# Patient Record
Sex: FEMALE | Race: WHITE | Employment: OTHER | URBAN - METROPOLITAN AREA
[De-identification: names, ages, dates, MRNs, and addresses within clinical notes are randomized per-mention and may not be internally consistent; named-entity substitution may affect disease eponyms.]

---

## 2018-02-20 ENCOUNTER — HOSPITAL ENCOUNTER (OUTPATIENT)
Dept: GENERAL RADIOLOGY | Age: 68
Discharge: HOME | End: 2018-02-20
Payer: MEDICARE

## 2018-02-20 DIAGNOSIS — R06.02 SHORTNESS OF BREATH: ICD-10-CM

## 2018-02-20 PROCEDURE — 71046 X-RAY EXAM CHEST 2 VIEWS: CPT

## 2019-01-22 ENCOUNTER — HOSPITAL ENCOUNTER (OUTPATIENT)
Dept: MRI IMAGING | Age: 69
Discharge: HOME | End: 2019-01-22
Attending: ORTHOPAEDIC SURGERY
Payer: MEDICARE

## 2019-01-22 DIAGNOSIS — M19.011 ARTHROSIS OF RIGHT ACROMIOCLAVICULAR JOINT: ICD-10-CM

## 2019-01-22 DIAGNOSIS — M75.121 COMPLETE TEAR OF RIGHT ROTATOR CUFF: ICD-10-CM

## 2019-01-22 PROCEDURE — 73221 MRI JOINT UPR EXTREM W/O DYE: CPT

## 2019-09-18 ENCOUNTER — HOSPITAL ENCOUNTER (OUTPATIENT)
Dept: MRI IMAGING | Age: 69
Discharge: HOME | End: 2019-09-18
Attending: ORTHOPAEDIC SURGERY
Payer: MEDICARE

## 2019-09-18 DIAGNOSIS — M48.02 CERVICAL SPINAL STENOSIS: ICD-10-CM

## 2019-09-18 DIAGNOSIS — M54.12 CERVICAL RADICULITIS: ICD-10-CM

## 2019-09-18 PROCEDURE — 72141 MRI NECK SPINE W/O DYE: CPT

## 2019-09-24 ENCOUNTER — HOSPITAL ENCOUNTER (OUTPATIENT)
Dept: PREADMISSION TESTING | Age: 69
Discharge: HOME | End: 2019-09-24
Payer: MEDICARE

## 2019-09-24 ENCOUNTER — HOSPITAL ENCOUNTER (OUTPATIENT)
Dept: GENERAL RADIOLOGY | Age: 69
Discharge: HOME | End: 2019-09-24
Payer: MEDICARE

## 2019-09-24 VITALS
WEIGHT: 236 LBS | BODY MASS INDEX: 34.96 KG/M2 | TEMPERATURE: 97.9 F | SYSTOLIC BLOOD PRESSURE: 140 MMHG | HEART RATE: 72 BPM | HEIGHT: 69 IN | OXYGEN SATURATION: 96 % | RESPIRATION RATE: 20 BRPM | DIASTOLIC BLOOD PRESSURE: 72 MMHG

## 2019-09-24 DIAGNOSIS — R06.89 DYSPNEA AND RESPIRATORY ABNORMALITY: ICD-10-CM

## 2019-09-24 DIAGNOSIS — R06.00 DYSPNEA AND RESPIRATORY ABNORMALITY: ICD-10-CM

## 2019-09-24 LAB
25(OH)D3 SERPL-MCNC: 12.9 NG/ML (ref 30–100)
ABO + RH BLD: NORMAL
ALBUMIN SERPL-MCNC: 4 G/DL (ref 3.5–5)
ALBUMIN/GLOB SERPL: 1 {RATIO} (ref 1.1–2.2)
ALP SERPL-CCNC: 100 U/L (ref 45–117)
ALT SERPL-CCNC: 12 U/L (ref 12–78)
ANION GAP SERPL CALC-SCNC: 8 MMOL/L (ref 5–15)
APPEARANCE UR: CLEAR
APTT PPP: 25.7 SEC (ref 22.1–32)
AST SERPL-CCNC: 22 U/L (ref 15–37)
BACTERIA URNS QL MICRO: NEGATIVE /HPF
BASOPHILS # BLD: 0 K/UL (ref 0–0.1)
BASOPHILS NFR BLD: 0 % (ref 0–1)
BILIRUB SERPL-MCNC: 0.4 MG/DL (ref 0.2–1)
BILIRUB UR QL: NEGATIVE
BLOOD GROUP ANTIBODIES SERPL: NORMAL
BUN SERPL-MCNC: 13 MG/DL (ref 6–20)
BUN/CREAT SERPL: 16 (ref 12–20)
CALCIUM SERPL-MCNC: 9.6 MG/DL (ref 8.5–10.1)
CHLORIDE SERPL-SCNC: 103 MMOL/L (ref 97–108)
CO2 SERPL-SCNC: 27 MMOL/L (ref 21–32)
COLOR UR: NORMAL
CREAT SERPL-MCNC: 0.81 MG/DL (ref 0.55–1.02)
DIFFERENTIAL METHOD BLD: ABNORMAL
EOSINOPHIL # BLD: 0 K/UL (ref 0–0.4)
EOSINOPHIL NFR BLD: 0 % (ref 0–7)
EPITH CASTS URNS QL MICRO: NORMAL /LPF
ERYTHROCYTE [DISTWIDTH] IN BLOOD BY AUTOMATED COUNT: 13.4 % (ref 11.5–14.5)
EST. AVERAGE GLUCOSE BLD GHB EST-MCNC: 120 MG/DL
GLOBULIN SER CALC-MCNC: 3.9 G/DL (ref 2–4)
GLUCOSE SERPL-MCNC: 102 MG/DL (ref 65–100)
GLUCOSE UR STRIP.AUTO-MCNC: NEGATIVE MG/DL
HBA1C MFR BLD: 5.8 % (ref 4.2–6.3)
HCT VFR BLD AUTO: 43 % (ref 35–47)
HGB BLD-MCNC: 13.8 G/DL (ref 11.5–16)
HGB UR QL STRIP: NEGATIVE
HYALINE CASTS URNS QL MICRO: NORMAL /LPF (ref 0–5)
IMM GRANULOCYTES # BLD AUTO: 0.1 K/UL (ref 0–0.04)
IMM GRANULOCYTES NFR BLD AUTO: 1 % (ref 0–0.5)
INR PPP: 1 (ref 0.9–1.1)
KETONES UR QL STRIP.AUTO: NEGATIVE MG/DL
LEUKOCYTE ESTERASE UR QL STRIP.AUTO: NEGATIVE
LYMPHOCYTES # BLD: 3.4 K/UL (ref 0.8–3.5)
LYMPHOCYTES NFR BLD: 14 % (ref 12–49)
MCH RBC QN AUTO: 29.8 PG (ref 26–34)
MCHC RBC AUTO-ENTMCNC: 32.1 G/DL (ref 30–36.5)
MCV RBC AUTO: 92.9 FL (ref 80–99)
MONOCYTES # BLD: 1.8 K/UL (ref 0–1)
MONOCYTES NFR BLD: 7 % (ref 5–13)
NEUTS SEG # BLD: 19.4 K/UL (ref 1.8–8)
NEUTS SEG NFR BLD: 78 % (ref 32–75)
NITRITE UR QL STRIP.AUTO: NEGATIVE
NRBC # BLD: 0 K/UL (ref 0–0.01)
NRBC BLD-RTO: 0 PER 100 WBC
PH UR STRIP: 6 [PH] (ref 5–8)
PLATELET # BLD AUTO: 290 K/UL (ref 150–400)
PMV BLD AUTO: 11.2 FL (ref 8.9–12.9)
POTASSIUM SERPL-SCNC: 4.5 MMOL/L (ref 3.5–5.1)
PROT SERPL-MCNC: 7.9 G/DL (ref 6.4–8.2)
PROT UR STRIP-MCNC: NEGATIVE MG/DL
PROTHROMBIN TIME: 9.8 SEC (ref 9–11.1)
RBC # BLD AUTO: 4.63 M/UL (ref 3.8–5.2)
RBC #/AREA URNS HPF: NORMAL /HPF (ref 0–5)
SODIUM SERPL-SCNC: 138 MMOL/L (ref 136–145)
SP GR UR REFRACTOMETRY: 1.02 (ref 1–1.03)
SPECIMEN EXP DATE BLD: NORMAL
THERAPEUTIC RANGE,PTTT: NORMAL SECS (ref 58–77)
UA: UC IF INDICATED,UAUC: NORMAL
UROBILINOGEN UR QL STRIP.AUTO: 0.2 EU/DL (ref 0.2–1)
WBC # BLD AUTO: 24.7 K/UL (ref 3.6–11)
WBC URNS QL MICRO: NORMAL /HPF (ref 0–4)

## 2019-09-24 PROCEDURE — 85610 PROTHROMBIN TIME: CPT

## 2019-09-24 PROCEDURE — 82306 VITAMIN D 25 HYDROXY: CPT

## 2019-09-24 PROCEDURE — 86900 BLOOD TYPING SEROLOGIC ABO: CPT

## 2019-09-24 PROCEDURE — 85025 COMPLETE CBC W/AUTO DIFF WBC: CPT

## 2019-09-24 PROCEDURE — 83036 HEMOGLOBIN GLYCOSYLATED A1C: CPT

## 2019-09-24 PROCEDURE — 85730 THROMBOPLASTIN TIME PARTIAL: CPT

## 2019-09-24 PROCEDURE — 80053 COMPREHEN METABOLIC PANEL: CPT

## 2019-09-24 PROCEDURE — 36415 COLL VENOUS BLD VENIPUNCTURE: CPT

## 2019-09-24 PROCEDURE — 71046 X-RAY EXAM CHEST 2 VIEWS: CPT

## 2019-09-24 PROCEDURE — 81001 URINALYSIS AUTO W/SCOPE: CPT

## 2019-09-24 RX ORDER — METOPROLOL TARTRATE 25 MG/1
TABLET, FILM COATED ORAL 2 TIMES DAILY
COMMUNITY

## 2019-09-24 RX ORDER — HYDROXYCHLOROQUINE SULFATE 200 MG/1
200 TABLET, FILM COATED ORAL DAILY
COMMUNITY
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
## 2019-09-24 NOTE — PERIOP NOTES
[address], [address] [facility]   MAIN OR                                  [phone]   MAIN PRE OP                          [phone]                                                                                AMBULATORY PRE OP          [phone]  PRE-ADMISSION TESTING    [phone]   Surgery Date:   10/1/19         Is surgery arrival time given by surgeon? NO  If NO, [facility] staff will call you between 3 and 7pm the day before your surgery with your arrival time. (If your surgery is on a Monday, we will call you the Friday before.)    Call [phone] after 7pm Monday-Friday if you did not receive your arrival time. INSTRUCTIONS BEFORE YOUR SURGERY   When You  Arrive Arrive at the [address]nd [address] [facility] on the day of your surgery  Have your insurance card, photo ID, and any copayment (if needed)   Food   and   Drink NO food or drink after midnight the night before surgery    This means NO water, gum, mints, coffee, juice, etc.  No alcohol (beer, wine, liquor) 24 hours before and after surgery   Medications to   TAKE   Morning of Surgery MEDICATIONS TO TAKE THE MORNING OF SURGERY WITH A SIP OF WATER:    Metoprolol,Nexium   Medications  To  STOP      7 days before surgery  Non-Steroidal anti-inflammatory Drugs (NSAID's): for example, Ibuprofen (Advil, Motrin), Naproxen (Aleve)   Aspirin, if taking for pain    Herbal supplements, vitamins, and fish oil   Other:  (Pain medications not listed above, including Tylenol may be taken)   Blood  Thinners  If you take  Aspirin, Plavix, Coumadin, or any blood-thinning or anti-blood clot medicine, talk to the doctor who prescribed the medications for pre-operative instructions.    Bathing Clothing  Jewelry  Valuables       If you shower the morning of surgery, please do not apply anything to your skin (lotions, powders, deodorant, or makeup, especially mascara)   Follow all special bath instructions (for total joint replacement, spine and bowel surgeries)   Do not shave or trim anywhere 24 hours before surgery   Wear your hair loose or down; no pony-tails, buns, or metal hair clips   Wear loose, comfortable, clean clothes   Wear glasses instead of contacts   Leave money, valuables, and jewelry, including body piercings, at home   Going Home - or Spending the Night  SAME-DAY SURGERY: You must have a responsible adult drive you home and stay with you 24 hours after surgery   ADMITS: If your doctor is keeping you in the hospital after surgery, leave personal belongings/luggage in your car until you have a hospital room number. Hospital discharge time is 12 noon  Drivers must be here before 12 noon unless you are told differently   Special Instructions 16. Special Instructions:  · Use Chlorhexidine Care Fusion wash and sponges 3 days prior to surgery as instructed. · Incentive spirometer given with instructions to practice at home and bring back to the hospital on the day of surgery. · [facility] will contact you if your Hemoglobin A1C is greater than 7.5. · Pain pamphlet and Call Don't Fall reminder reviewed with patient. ·  parking is complimentary Monday - Friday 7 am - 5 pm  · Bring PTA Medication list day of surgery with the last doses taken documented   · Do not bring medication bottles the day of surgery     Follow all instructions so your surgery wont be cancelled. Please, be on time. If a situation occurs and you are delayed the day of surgery, call [phone]     If your physical condition changes (like a fever, cold, flu, etc.) call your surgeon. The patient was contacted  in person. Home medication reviewed and verified during PAT appointment. The patient verbalizes understanding of all instructions and does not  need reinforcement.

## 2019-09-24 NOTE — FACE TO FACE
The patient and  attended the pre-operative spine class. The content of the class was presented using a power point presentation and visual demonstrations specific for patients undergoing surgical spine procedures of the neck and back. A pre-operative Patient education booklet specific to spine surgery was given to patient. Incentive spirometer and CHG bath kits were demonstrated in class. Day of surgery routine and expectations, hospital routine and expectations, nutrition, alcohol, nicotine, medications, infection control, pain management, DVT precautions and equipment, ice therapy, durable medical equipment, exercises, mobility expectations and precautions, home preparation and safety were reviewed in class. During class, patient had opportunities to ask questions of the material presented as well as any concerns about their upcoming surgery.

## 2019-09-25 LAB
BACTERIA SPEC CULT: NORMAL
BACTERIA SPEC CULT: NORMAL
SERVICE CMNT-IMP: NORMAL

## 2019-09-25 RX ORDER — CHOLECALCIFEROL TAB 125 MCG (5000 UNIT) 125 MCG
10000 TAB ORAL DAILY
Qty: 60 TAB | Refills: 0 | Status: SHIPPED | OUTPATIENT
Start: 2019-09-25 | End: 2019-10-25

## 2019-09-30 ENCOUNTER — ANESTHESIA EVENT (OUTPATIENT)
Dept: SURGERY | Age: 69
End: 2019-09-30
Payer: MEDICARE

## 2019-10-01 ENCOUNTER — ANESTHESIA (OUTPATIENT)
Dept: SURGERY | Age: 69
End: 2019-10-01
Payer: MEDICARE

## 2019-10-01 ENCOUNTER — HOSPITAL ENCOUNTER (OUTPATIENT)
Age: 69
Setting detail: OBSERVATION
Discharge: HOME | End: 2019-10-02
Attending: ORTHOPAEDIC SURGERY | Admitting: ORTHOPAEDIC SURGERY
Payer: MEDICARE

## 2019-10-01 ENCOUNTER — APPOINTMENT (OUTPATIENT)
Dept: GENERAL RADIOLOGY | Age: 69
End: 2019-10-01
Attending: ORTHOPAEDIC SURGERY
Payer: MEDICARE

## 2019-10-01 DIAGNOSIS — R52 ACUTE PAIN: Primary | ICD-10-CM

## 2019-10-01 PROBLEM — M48.02 CERVICAL STENOSIS OF SPINAL CANAL: Status: ACTIVE | Noted: 2019-10-01

## 2019-10-01 LAB
BASOPHILS # BLD: 0.1 K/UL (ref 0–0.1)
BASOPHILS NFR BLD: 1 % (ref 0–1)
DIFFERENTIAL METHOD BLD: ABNORMAL
EOSINOPHIL # BLD: 0.6 K/UL (ref 0–0.4)
EOSINOPHIL NFR BLD: 4 % (ref 0–7)
ERYTHROCYTE [DISTWIDTH] IN BLOOD BY AUTOMATED COUNT: 13.8 % (ref 11.5–14.5)
HCT VFR BLD AUTO: 41.8 % (ref 35–47)
HGB BLD-MCNC: 13.6 G/DL (ref 11.5–16)
IMM GRANULOCYTES # BLD AUTO: 0.1 K/UL (ref 0–0.04)
IMM GRANULOCYTES NFR BLD AUTO: 1 % (ref 0–0.5)
LYMPHOCYTES # BLD: 4.7 K/UL (ref 0.8–3.5)
LYMPHOCYTES NFR BLD: 33 % (ref 12–49)
MCH RBC QN AUTO: 30 PG (ref 26–34)
MCHC RBC AUTO-ENTMCNC: 32.5 G/DL (ref 30–36.5)
MCV RBC AUTO: 92.1 FL (ref 80–99)
MONOCYTES # BLD: 1.3 K/UL (ref 0–1)
MONOCYTES NFR BLD: 9 % (ref 5–13)
NEUTS SEG # BLD: 7.4 K/UL (ref 1.8–8)
NEUTS SEG NFR BLD: 52 % (ref 32–75)
NRBC # BLD: 0 K/UL (ref 0–0.01)
NRBC BLD-RTO: 0 PER 100 WBC
PLATELET # BLD AUTO: 407 K/UL (ref 150–400)
PMV BLD AUTO: 10.2 FL (ref 8.9–12.9)
RBC # BLD AUTO: 4.54 M/UL (ref 3.8–5.2)
WBC # BLD AUTO: 14.3 K/UL (ref 3.6–11)

## 2019-10-01 PROCEDURE — 74011000272 HC RX REV CODE- 272: Performed by: ORTHOPAEDIC SURGERY

## 2019-10-01 PROCEDURE — 77030038552 HC DRN WND MDII -A: Performed by: ORTHOPAEDIC SURGERY

## 2019-10-01 PROCEDURE — 77030011267 HC ELECTRD BLD COVD -A: Performed by: ORTHOPAEDIC SURGERY

## 2019-10-01 PROCEDURE — 77030018846 HC SOL IRR STRL H20 ICUM -A: Performed by: ORTHOPAEDIC SURGERY

## 2019-10-01 PROCEDURE — 77030029099 HC BN WAX SSPC -A: Performed by: ORTHOPAEDIC SURGERY

## 2019-10-01 PROCEDURE — 77030040356 HC CORD BPLR FRCP COVD -A: Performed by: ORTHOPAEDIC SURGERY

## 2019-10-01 PROCEDURE — 74011250636 HC RX REV CODE- 250/636: Performed by: ANESTHESIOLOGY

## 2019-10-01 PROCEDURE — 99218 HC RM OBSERVATION: CPT

## 2019-10-01 PROCEDURE — 77030002933 HC SUT MCRYL J&J -A: Performed by: ORTHOPAEDIC SURGERY

## 2019-10-01 PROCEDURE — 74011250636 HC RX REV CODE- 250/636

## 2019-10-01 PROCEDURE — 77030038522: Performed by: ORTHOPAEDIC SURGERY

## 2019-10-01 PROCEDURE — 77030005513 HC CATH URETH FOL11 MDII -B: Performed by: ORTHOPAEDIC SURGERY

## 2019-10-01 PROCEDURE — 77030018673: Performed by: ORTHOPAEDIC SURGERY

## 2019-10-01 PROCEDURE — 77030011640 HC PAD GRND REM COVD -A: Performed by: ORTHOPAEDIC SURGERY

## 2019-10-01 PROCEDURE — 77030014647 HC SEAL FBRN TISSL BAXT -D: Performed by: ORTHOPAEDIC SURGERY

## 2019-10-01 PROCEDURE — C1713 ANCHOR/SCREW BN/BN,TIS/BN: HCPCS | Performed by: ORTHOPAEDIC SURGERY

## 2019-10-01 PROCEDURE — 77030020782 HC GWN BAIR PAWS FLX 3M -B

## 2019-10-01 PROCEDURE — 77030037302 HC SPCR CERV LORDTC INLC -G: Performed by: ORTHOPAEDIC SURGERY

## 2019-10-01 PROCEDURE — 77030013079 HC BLNKT BAIR HGGR 3M -A: Performed by: NURSE ANESTHETIST, CERTIFIED REGISTERED

## 2019-10-01 PROCEDURE — 74011250636 HC RX REV CODE- 250/636: Performed by: ORTHOPAEDIC SURGERY

## 2019-10-01 PROCEDURE — 76210000001 HC OR PH I REC 2.5 TO 3 HR: Performed by: ORTHOPAEDIC SURGERY

## 2019-10-01 PROCEDURE — 74011000250 HC RX REV CODE- 250: Performed by: NURSE ANESTHETIST, CERTIFIED REGISTERED

## 2019-10-01 PROCEDURE — 77030026438 HC STYL ET INTUB CARD -A: Performed by: NURSE ANESTHETIST, CERTIFIED REGISTERED

## 2019-10-01 PROCEDURE — 77030040361 HC SLV COMPR DVT MDII -B

## 2019-10-01 PROCEDURE — 76060000034 HC ANESTHESIA 1.5 TO 2 HR: Performed by: ORTHOPAEDIC SURGERY

## 2019-10-01 PROCEDURE — 77030003666 HC NDL SPINAL BD -A: Performed by: ORTHOPAEDIC SURGERY

## 2019-10-01 PROCEDURE — 77030004391 HC BUR FLUT MEDT -C: Performed by: ORTHOPAEDIC SURGERY

## 2019-10-01 PROCEDURE — 74011250636 HC RX REV CODE- 250/636: Performed by: NURSE ANESTHETIST, CERTIFIED REGISTERED

## 2019-10-01 PROCEDURE — 77030031139 HC SUT VCRL2 J&J -A: Performed by: ORTHOPAEDIC SURGERY

## 2019-10-01 PROCEDURE — 74011000250 HC RX REV CODE- 250: Performed by: ANESTHESIOLOGY

## 2019-10-01 PROCEDURE — 76010000162 HC OR TIME 1.5 TO 2 HR INTENSV-TIER 1: Performed by: ORTHOPAEDIC SURGERY

## 2019-10-01 PROCEDURE — 74011000250 HC RX REV CODE- 250: Performed by: ORTHOPAEDIC SURGERY

## 2019-10-01 PROCEDURE — 77030030102 HC BIT DRL PYRNES K2M -B: Performed by: ORTHOPAEDIC SURGERY

## 2019-10-01 PROCEDURE — 74011250637 HC RX REV CODE- 250/637: Performed by: ORTHOPAEDIC SURGERY

## 2019-10-01 PROCEDURE — 77030018836 HC SOL IRR NACL ICUM -A: Performed by: ORTHOPAEDIC SURGERY

## 2019-10-01 PROCEDURE — 85025 COMPLETE CBC W/AUTO DIFF WBC: CPT

## 2019-10-01 PROCEDURE — 77030008684 HC TU ET CUF COVD -B: Performed by: NURSE ANESTHETIST, CERTIFIED REGISTERED

## 2019-10-01 PROCEDURE — 36415 COLL VENOUS BLD VENIPUNCTURE: CPT

## 2019-10-01 DEVICE — PLATE SPNL L34MM ANTR CERV 2 LEV CONSTRN NONCOMPLIANT LO: Type: IMPLANTABLE DEVICE | Site: SPINE CERVICAL | Status: FUNCTIONAL

## 2019-10-01 DEVICE — SCREW SPNL L14MM DIA4MM CERV ST CONSTRN PYRENEES: Type: IMPLANTABLE DEVICE | Site: SPINE CERVICAL | Status: FUNCTIONAL

## 2019-10-01 DEVICE — IMPLANTABLE DEVICE: Type: IMPLANTABLE DEVICE | Site: SPINE CERVICAL | Status: FUNCTIONAL

## 2019-10-01 RX ORDER — SODIUM CHLORIDE 0.9 % (FLUSH) 0.9 %
5-40 SYRINGE (ML) INJECTION EVERY 8 HOURS
Status: DISCONTINUED | OUTPATIENT
Start: 2019-10-01 | End: 2019-10-02 | Stop reason: HOSPADM

## 2019-10-01 RX ORDER — SODIUM CHLORIDE 0.9 % (FLUSH) 0.9 %
5-40 SYRINGE (ML) INJECTION AS NEEDED
Status: DISCONTINUED | OUTPATIENT
Start: 2019-10-01 | End: 2019-10-02 | Stop reason: HOSPADM

## 2019-10-01 RX ORDER — GLYCOPYRROLATE 0.2 MG/ML
INJECTION INTRAMUSCULAR; INTRAVENOUS AS NEEDED
Status: DISCONTINUED | OUTPATIENT
Start: 2019-10-01 | End: 2019-10-01 | Stop reason: HOSPADM

## 2019-10-01 RX ORDER — NEOSTIGMINE METHYLSULFATE 1 MG/ML
INJECTION INTRAVENOUS AS NEEDED
Status: DISCONTINUED | OUTPATIENT
Start: 2019-10-01 | End: 2019-10-01 | Stop reason: HOSPADM

## 2019-10-01 RX ORDER — PROPOFOL 10 MG/ML
INJECTION, EMULSION INTRAVENOUS AS NEEDED
Status: DISCONTINUED | OUTPATIENT
Start: 2019-10-01 | End: 2019-10-01 | Stop reason: HOSPADM

## 2019-10-01 RX ORDER — SODIUM CHLORIDE 9 MG/ML
125 INJECTION, SOLUTION INTRAVENOUS CONTINUOUS
Status: DISCONTINUED | OUTPATIENT
Start: 2019-10-01 | End: 2019-10-02 | Stop reason: HOSPADM

## 2019-10-01 RX ORDER — OXYCODONE HYDROCHLORIDE 5 MG/1
10 TABLET ORAL
Status: DISCONTINUED | OUTPATIENT
Start: 2019-10-01 | End: 2019-10-02 | Stop reason: HOSPADM

## 2019-10-01 RX ORDER — HYDROMORPHONE HCL/0.9% NACL/PF 0.5 MG/ML
PLASTIC BAG, INJECTION (ML) INTRAVENOUS
Status: DISCONTINUED | OUTPATIENT
Start: 2019-10-01 | End: 2019-10-02 | Stop reason: HOSPADM

## 2019-10-01 RX ORDER — HYDROMORPHONE HYDROCHLORIDE 1 MG/ML
.25-1 INJECTION, SOLUTION INTRAMUSCULAR; INTRAVENOUS; SUBCUTANEOUS
Status: DISCONTINUED | OUTPATIENT
Start: 2019-10-01 | End: 2019-10-01 | Stop reason: HOSPADM

## 2019-10-01 RX ORDER — VANCOMYCIN/0.9 % SOD CHLORIDE 1.5G/250ML
1500 PLASTIC BAG, INJECTION (ML) INTRAVENOUS EVERY 12 HOURS
Status: COMPLETED | OUTPATIENT
Start: 2019-10-01 | End: 2019-10-02

## 2019-10-01 RX ORDER — ACETAMINOPHEN 325 MG/1
650 TABLET ORAL
Status: DISCONTINUED | OUTPATIENT
Start: 2019-10-01 | End: 2019-10-02 | Stop reason: HOSPADM

## 2019-10-01 RX ORDER — FENTANYL CITRATE 50 UG/ML
INJECTION, SOLUTION INTRAMUSCULAR; INTRAVENOUS AS NEEDED
Status: DISCONTINUED | OUTPATIENT
Start: 2019-10-01 | End: 2019-10-01 | Stop reason: HOSPADM

## 2019-10-01 RX ORDER — HYDROMORPHONE HYDROCHLORIDE 1 MG/ML
0.5 INJECTION, SOLUTION INTRAMUSCULAR; INTRAVENOUS; SUBCUTANEOUS
Status: DISCONTINUED | OUTPATIENT
Start: 2019-10-01 | End: 2019-10-02 | Stop reason: HOSPADM

## 2019-10-01 RX ORDER — CHOLECALCIFEROL TAB 125 MCG (5000 UNIT) 125 MCG
10000 TAB ORAL DAILY
Status: DISCONTINUED | OUTPATIENT
Start: 2019-10-02 | End: 2019-10-02 | Stop reason: HOSPADM

## 2019-10-01 RX ORDER — ONDANSETRON 2 MG/ML
INJECTION INTRAMUSCULAR; INTRAVENOUS AS NEEDED
Status: DISCONTINUED | OUTPATIENT
Start: 2019-10-01 | End: 2019-10-01 | Stop reason: HOSPADM

## 2019-10-01 RX ORDER — VANCOMYCIN/0.9 % SOD CHLORIDE 1.5G/250ML
1500 PLASTIC BAG, INJECTION (ML) INTRAVENOUS ONCE
Status: COMPLETED | OUTPATIENT
Start: 2019-10-01 | End: 2019-10-01

## 2019-10-01 RX ORDER — EPHEDRINE SULFATE/0.9% NACL/PF 50 MG/5 ML
SYRINGE (ML) INTRAVENOUS AS NEEDED
Status: DISCONTINUED | OUTPATIENT
Start: 2019-10-01 | End: 2019-10-01 | Stop reason: HOSPADM

## 2019-10-01 RX ORDER — FAMOTIDINE 20 MG/1
20 TABLET, FILM COATED ORAL 2 TIMES DAILY
Status: DISCONTINUED | OUTPATIENT
Start: 2019-10-01 | End: 2019-10-02 | Stop reason: HOSPADM

## 2019-10-01 RX ORDER — METOPROLOL TARTRATE 25 MG/1
25 TABLET, FILM COATED ORAL 2 TIMES DAILY
Status: DISCONTINUED | OUTPATIENT
Start: 2019-10-01 | End: 2019-10-02 | Stop reason: HOSPADM

## 2019-10-01 RX ORDER — ONDANSETRON 2 MG/ML
4 INJECTION INTRAMUSCULAR; INTRAVENOUS
Status: DISCONTINUED | OUTPATIENT
Start: 2019-10-01 | End: 2019-10-02 | Stop reason: HOSPADM

## 2019-10-01 RX ORDER — PROPOFOL 10 MG/ML
INJECTION, EMULSION INTRAVENOUS
Status: DISCONTINUED | OUTPATIENT
Start: 2019-10-01 | End: 2019-10-01 | Stop reason: HOSPADM

## 2019-10-01 RX ORDER — LIDOCAINE HYDROCHLORIDE 20 MG/ML
INJECTION, SOLUTION EPIDURAL; INFILTRATION; INTRACAUDAL; PERINEURAL AS NEEDED
Status: DISCONTINUED | OUTPATIENT
Start: 2019-10-01 | End: 2019-10-01 | Stop reason: HOSPADM

## 2019-10-01 RX ORDER — SUCCINYLCHOLINE CHLORIDE 20 MG/ML
INJECTION INTRAMUSCULAR; INTRAVENOUS AS NEEDED
Status: DISCONTINUED | OUTPATIENT
Start: 2019-10-01 | End: 2019-10-01 | Stop reason: HOSPADM

## 2019-10-01 RX ORDER — MIDAZOLAM HYDROCHLORIDE 1 MG/ML
INJECTION, SOLUTION INTRAMUSCULAR; INTRAVENOUS AS NEEDED
Status: DISCONTINUED | OUTPATIENT
Start: 2019-10-01 | End: 2019-10-01 | Stop reason: HOSPADM

## 2019-10-01 RX ORDER — DEXAMETHASONE SODIUM PHOSPHATE 4 MG/ML
INJECTION, SOLUTION INTRA-ARTICULAR; INTRALESIONAL; INTRAMUSCULAR; INTRAVENOUS; SOFT TISSUE AS NEEDED
Status: DISCONTINUED | OUTPATIENT
Start: 2019-10-01 | End: 2019-10-01 | Stop reason: HOSPADM

## 2019-10-01 RX ORDER — NALOXONE HYDROCHLORIDE 0.4 MG/ML
0.4 INJECTION, SOLUTION INTRAMUSCULAR; INTRAVENOUS; SUBCUTANEOUS AS NEEDED
Status: DISCONTINUED | OUTPATIENT
Start: 2019-10-01 | End: 2019-10-02 | Stop reason: HOSPADM

## 2019-10-01 RX ORDER — PANTOPRAZOLE SODIUM 40 MG/1
40 TABLET, DELAYED RELEASE ORAL
Status: DISCONTINUED | OUTPATIENT
Start: 2019-10-02 | End: 2019-10-02 | Stop reason: HOSPADM

## 2019-10-01 RX ORDER — OXYCODONE HYDROCHLORIDE 5 MG/1
5 TABLET ORAL
Status: DISCONTINUED | OUTPATIENT
Start: 2019-10-01 | End: 2019-10-02 | Stop reason: HOSPADM

## 2019-10-01 RX ORDER — FACIAL-BODY WIPES
10 EACH TOPICAL DAILY PRN
Status: DISCONTINUED | OUTPATIENT
Start: 2019-10-03 | End: 2019-10-02 | Stop reason: HOSPADM

## 2019-10-01 RX ORDER — SODIUM CHLORIDE, SODIUM LACTATE, POTASSIUM CHLORIDE, CALCIUM CHLORIDE 600; 310; 30; 20 MG/100ML; MG/100ML; MG/100ML; MG/100ML
100 INJECTION, SOLUTION INTRAVENOUS CONTINUOUS
Status: DISCONTINUED | OUTPATIENT
Start: 2019-10-01 | End: 2019-10-01 | Stop reason: HOSPADM

## 2019-10-01 RX ORDER — ROCURONIUM BROMIDE 10 MG/ML
INJECTION, SOLUTION INTRAVENOUS AS NEEDED
Status: DISCONTINUED | OUTPATIENT
Start: 2019-10-01 | End: 2019-10-01 | Stop reason: HOSPADM

## 2019-10-01 RX ORDER — POLYETHYLENE GLYCOL 3350 17 G/17G
17 POWDER, FOR SOLUTION ORAL DAILY
Status: DISCONTINUED | OUTPATIENT
Start: 2019-10-02 | End: 2019-10-02 | Stop reason: HOSPADM

## 2019-10-01 RX ORDER — AMOXICILLIN 250 MG
1 CAPSULE ORAL 2 TIMES DAILY
Status: DISCONTINUED | OUTPATIENT
Start: 2019-10-02 | End: 2019-10-02 | Stop reason: HOSPADM

## 2019-10-01 RX ORDER — LIDOCAINE HYDROCHLORIDE 10 MG/ML
0.1 INJECTION, SOLUTION EPIDURAL; INFILTRATION; INTRACAUDAL; PERINEURAL AS NEEDED
Status: DISCONTINUED | OUTPATIENT
Start: 2019-10-01 | End: 2019-10-01 | Stop reason: HOSPADM

## 2019-10-01 RX ADMIN — SODIUM CHLORIDE 6.25 MG: 9 INJECTION INTRAMUSCULAR; INTRAVENOUS; SUBCUTANEOUS at 16:05

## 2019-10-01 RX ADMIN — Medication: at 13:16

## 2019-10-01 RX ADMIN — SODIUM CHLORIDE, SODIUM LACTATE, POTASSIUM CHLORIDE, AND CALCIUM CHLORIDE: 600; 310; 30; 20 INJECTION, SOLUTION INTRAVENOUS at 12:01

## 2019-10-01 RX ADMIN — ROCURONIUM BROMIDE 10 MG: 50 INJECTION, SOLUTION INTRAVENOUS at 11:03

## 2019-10-01 RX ADMIN — SODIUM CHLORIDE 100 MCG: 9 INJECTION INTRAMUSCULAR; INTRAVENOUS; SUBCUTANEOUS at 11:18

## 2019-10-01 RX ADMIN — FENTANYL CITRATE 50 MCG: 50 INJECTION, SOLUTION INTRAMUSCULAR; INTRAVENOUS at 11:41

## 2019-10-01 RX ADMIN — SODIUM CHLORIDE 125 ML/HR: 900 INJECTION, SOLUTION INTRAVENOUS at 13:16

## 2019-10-01 RX ADMIN — PROPOFOL 100 MCG/KG/MIN: 10 INJECTION, EMULSION INTRAVENOUS at 11:10

## 2019-10-01 RX ADMIN — Medication 10 MG: at 11:18

## 2019-10-01 RX ADMIN — HYDROMORPHONE HYDROCHLORIDE 0.5 MG: 1 INJECTION, SOLUTION INTRAMUSCULAR; INTRAVENOUS; SUBCUTANEOUS at 13:11

## 2019-10-01 RX ADMIN — Medication 10 ML: at 18:00

## 2019-10-01 RX ADMIN — ONDANSETRON 4 MG: 2 INJECTION INTRAMUSCULAR; INTRAVENOUS at 11:12

## 2019-10-01 RX ADMIN — PROPOFOL 150 MG: 10 INJECTION, EMULSION INTRAVENOUS at 11:03

## 2019-10-01 RX ADMIN — SUCCINYLCHOLINE CHLORIDE 100 MG: 20 INJECTION, SOLUTION INTRAMUSCULAR; INTRAVENOUS; PARENTERAL at 11:03

## 2019-10-01 RX ADMIN — MIDAZOLAM HYDROCHLORIDE 2 MG: 1 INJECTION, SOLUTION INTRAMUSCULAR; INTRAVENOUS at 10:54

## 2019-10-01 RX ADMIN — ROCURONIUM BROMIDE 20 MG: 50 INJECTION, SOLUTION INTRAVENOUS at 11:37

## 2019-10-01 RX ADMIN — DEXAMETHASONE SODIUM PHOSPHATE 8 MG: 4 INJECTION, SOLUTION INTRAMUSCULAR; INTRAVENOUS at 11:14

## 2019-10-01 RX ADMIN — Medication 1500 MG: at 21:07

## 2019-10-01 RX ADMIN — GLYCOPYRROLATE 0.4 MG: 0.2 INJECTION, SOLUTION INTRAMUSCULAR; INTRAVENOUS at 12:31

## 2019-10-01 RX ADMIN — Medication 10 ML: at 22:00

## 2019-10-01 RX ADMIN — FAMOTIDINE 20 MG: 20 TABLET ORAL at 17:31

## 2019-10-01 RX ADMIN — Medication: at 19:15

## 2019-10-01 RX ADMIN — FENTANYL CITRATE 100 MCG: 50 INJECTION, SOLUTION INTRAMUSCULAR; INTRAVENOUS at 10:54

## 2019-10-01 RX ADMIN — SODIUM CHLORIDE, SODIUM LACTATE, POTASSIUM CHLORIDE, AND CALCIUM CHLORIDE 100 ML/HR: 600; 310; 30; 20 INJECTION, SOLUTION INTRAVENOUS at 10:12

## 2019-10-01 RX ADMIN — Medication 1500 MG: at 10:43

## 2019-10-01 RX ADMIN — NEOSTIGMINE METHYLSULFATE 2 MG: 1 INJECTION, SOLUTION INTRAVENOUS at 12:31

## 2019-10-01 RX ADMIN — METOPROLOL TARTRATE 25 MG: 25 TABLET ORAL at 17:31

## 2019-10-01 RX ADMIN — LIDOCAINE HYDROCHLORIDE 60 MG: 20 INJECTION, SOLUTION INTRAVENOUS at 11:03

## 2019-10-01 RX ADMIN — Medication 10 MG: at 11:08

## 2019-10-01 RX ADMIN — SODIUM CHLORIDE 100 MCG: 9 INJECTION INTRAMUSCULAR; INTRAVENOUS; SUBCUTANEOUS at 11:08

## 2019-10-01 RX ADMIN — FENTANYL CITRATE 50 MCG: 50 INJECTION, SOLUTION INTRAMUSCULAR; INTRAVENOUS at 11:58

## 2019-10-01 RX ADMIN — Medication 10 ML: at 17:31

## 2019-10-01 RX ADMIN — SODIUM CHLORIDE 125 ML/HR: 900 INJECTION, SOLUTION INTRAVENOUS at 21:04

## 2019-10-01 NOTE — H&P
Date of Surgery Update:  Pedro Lopez was seen and examined. History and physical has been reviewed. The patient has been examined.  There have been no significant clinical changes since the completion of the originally dated History and Physical.    Signed By: Ruby Graham MD     October 1, 2019 9:53 AM

## 2019-10-01 NOTE — PROGRESS NOTES
TRANSFER - IN REPORT:    Verbal report received from Jez(name) on Chandrika Andre  being received from [employer]) for routine progression of care      Report consisted of patients Situation, Background, Assessment and   Recommendations(SBAR). Information from the following report(s) SBAR, Kardex, OR Summary, Procedure Summary, Intake/Output and MAR was reviewed with the receiving nurse. Opportunity for questions and clarification was provided. Assessment completed upon patients arrival to unit and care assumed. Primary Nurse Joe Ramirez RN and Andree Esparza RN performed a dual skin assessment on this patient No impairment noted  Ethan score is 23  Surgery site.

## 2019-10-01 NOTE — ANESTHESIA PREPROCEDURE EVALUATION
Relevant Problems   No relevant active problems       Anesthetic History     PONV          Review of Systems / Medical History  Patient summary reviewed and pertinent labs reviewed    Pulmonary  Within defined limits                 Neuro/Psych   Within defined limits           Cardiovascular            Dysrhythmias : PVC      Exercise tolerance: >4 METS  Comments: Cardiology clearance reviewed; nml EF; denies CP or SOB; METS >4   GI/Hepatic/Renal     GERD: well controlled           Endo/Other        Arthritis     Other Findings   Comments: Elevated WBC to 24 in preop on 9/24  CXR clear; denies dysuria; repeating CBC         Physical Exam    Airway  Mallampati: III  TM Distance: < 4 cm  Neck ROM: normal range of motion   Mouth opening: Normal     Cardiovascular    Rhythm: regular  Rate: normal         Dental  No notable dental hx       Pulmonary  Breath sounds clear to auscultation               Abdominal  GI exam deferred       Other Findings            Anesthetic Plan    ASA: 2  Anesthesia type: general          Induction: Intravenous  Anesthetic plan and risks discussed with: Patient

## 2019-10-01 NOTE — PERIOP NOTES
TRANSFER - OUT REPORT:    Verbal report given to MAHENDRA GARZON(name) on Hari Scales  being transferred to [facility] routine post - op       Report consisted of patients Situation, Background, Assessment and   Recommendations(SBAR). Information from the following report(s) SBAR, OR Summary, Procedure Summary, Intake/Output, MAR and Cardiac Rhythm NSR was reviewed with the receiving nurse. Lines:   Peripheral IV 10/01/19 Left Wrist (Active)   Site Assessment Clean, dry, & intact 10/1/2019  3:00 PM   Phlebitis Assessment 0 10/1/2019  3:00 PM   Infiltration Assessment 0 10/1/2019  3:00 PM   Dressing Status Clean, dry, & intact; Occlusive 10/1/2019  3:00 PM   Dressing Type Tape;Transparent 10/1/2019  3:00 PM   Hub Color/Line Status Pink; Infusing 10/1/2019  3:00 PM   Action Taken Blood drawn 10/1/2019 10:11 AM   Alcohol Cap Used Yes 10/1/2019 10:11 AM        Opportunity for questions and clarification was provided.       Patient transported with:   O2 @ 2 liters  Registered Nurse

## 2019-10-01 NOTE — OP NOTES
[address] [facility]  [address], [address] [facility]    OPERATIVE REPORT      NAME: Savannah Valentin    AGE: 77 y.o. YOB: 1950    MEDICAL RECORD NUMBER: [id number]    DATE OF SURGERY: 10/1/2019    OPERATIVE REPORT     PREOPERATIVE DIAGNOSIS: Cervical stenosis     POSTOPERATIVE DIAGNOSIS: Cervical stenosis    OPERATIVE PROCEDURE: C4 to C6 anterior cervical diskectomy and fusion with instrumentation and application of interbody spacer at C4-C5 and C5-C6. SURGEON: Natty Roberto MD     ASSISTANT: ANTHONY Rollins    ANESTHESIA: General    COMPLICATIONS: None    ESTIMATED BLOOD LOSS: 80 cc    INSTRUMENTATION: K2M plate, Seaspine spacer    NEUROMONITORING: SSEPs and spontaneous EMGs    INDICATION FOR PROCEDURE: The patient is a very pleasant 77 y.o. female with cervical stenosis. The patient elected to proceed with operative intervention. She was aware of the risks, benefits, and alternatives. She provided informed consent. PROCEDURE: The patient was identified in the preoperative holding area. The anterior cervical spine was marked by me. She was transferred to the operating room where general anesthesia was given. She was also given perioperative vancomycin antibiotics. The patient was placed supine on the operating room table. All bony prominences were well-padded. The shoulders were taped. The anterior cervical spine was prepped and draped in the usual standard fashion. We performed a surgical time-out. I made a skin incision on the left side. It was transverse. I exposed the anterior cervical spine. I placed a needle into the disk space to verify our levels. I exposed the disc spaces with electrocautery from uncus to uncus. I brought in the operating room microscope. I performed a diskectomy at C4-C5. I decompressed the spinal cord and nerve roots bilaterally. I prepared the endplates to bleeding bone. We had good hemostasis. I performed trial sizing. I placed a spacer into C4-C5 with the appropriate amount of tension and alignment. I performed an identical procedure at C5-C6. The endplates were prepared to bleeding bone. The spinal cord and nerve roots were decompressed. I placed a spacer into C5-C6 with the appropriate amount of tension and alignment. The spacers had allograft    I then placed an anterior cervical plate into C4, C5, and C6. The screws were locked to the plate according to the manufacture's specification. We had good hemostasis. I copiously irrigated the entire wound. I placed a deep drain. The wound was closed with 3-0 Vicryl and 4-0 Monocryl. A sterile dressing was applied. The patient was extubated and transferred to the recovery room in good medical condition. The PA assisted with closure and retraction    I, Dr. Sonal Artis, performed the above procedures.      Sonal Artis MD  10/1/2019

## 2019-10-01 NOTE — ANESTHESIA POSTPROCEDURE EVALUATION
Procedure(s):  C4-6 ANTERIOR CERVICAL DISCECTOMY AND FUSION WITH INSTRUMENTATION. general    Anesthesia Post Evaluation      Multimodal analgesia: multimodal analgesia not used between 6 hours prior to anesthesia start to PACU discharge  Patient location during evaluation: PACU  Patient participation: complete - patient participated  Level of consciousness: awake  Pain management: adequate  Airway patency: patent  Anesthetic complications: no  Cardiovascular status: acceptable, blood pressure returned to baseline and hemodynamically stable  Respiratory status: acceptable  Hydration status: acceptable  Post anesthesia nausea and vomiting:  controlled      Vitals Value Taken Time   /77 10/1/2019  2:48 PM   Temp 36.4 °C (97.6 °F) 10/1/2019  2:48 PM   Pulse 66 10/1/2019  2:57 PM   Resp 11 10/1/2019  2:57 PM   SpO2 93 % 10/1/2019  2:57 PM   Vitals shown include unvalidated device data.

## 2019-10-01 NOTE — PERIOP NOTES
Floseal Hemostatic Matrix 20mL given intraoperatively by Dr Maite Awad.      LOT  [id number]   REF  [id number]  EXP   02/11/2021

## 2019-10-02 VITALS
RESPIRATION RATE: 16 BRPM | WEIGHT: 233.91 LBS | DIASTOLIC BLOOD PRESSURE: 76 MMHG | TEMPERATURE: 97.8 F | SYSTOLIC BLOOD PRESSURE: 123 MMHG | OXYGEN SATURATION: 97 % | BODY MASS INDEX: 34.64 KG/M2 | HEART RATE: 62 BPM | HEIGHT: 69 IN

## 2019-10-02 LAB
ANION GAP SERPL CALC-SCNC: 1 MMOL/L (ref 5–15)
BUN SERPL-MCNC: 10 MG/DL (ref 6–20)
BUN/CREAT SERPL: 17 (ref 12–20)
CALCIUM SERPL-MCNC: 8.1 MG/DL (ref 8.5–10.1)
CHLORIDE SERPL-SCNC: 101 MMOL/L (ref 97–108)
CO2 SERPL-SCNC: 29 MMOL/L (ref 21–32)
CREAT SERPL-MCNC: 0.59 MG/DL (ref 0.55–1.02)
GLUCOSE SERPL-MCNC: 108 MG/DL (ref 65–100)
HGB BLD-MCNC: 11.1 G/DL (ref 11.5–16)
POTASSIUM SERPL-SCNC: 3.8 MMOL/L (ref 3.5–5.1)
SODIUM SERPL-SCNC: 131 MMOL/L (ref 136–145)

## 2019-10-02 PROCEDURE — 74011250637 HC RX REV CODE- 250/637: Performed by: NURSE PRACTITIONER

## 2019-10-02 PROCEDURE — 99218 HC RM OBSERVATION: CPT

## 2019-10-02 PROCEDURE — 74011250637 HC RX REV CODE- 250/637: Performed by: ORTHOPAEDIC SURGERY

## 2019-10-02 PROCEDURE — 36415 COLL VENOUS BLD VENIPUNCTURE: CPT

## 2019-10-02 PROCEDURE — 74011250636 HC RX REV CODE- 250/636: Performed by: ORTHOPAEDIC SURGERY

## 2019-10-02 PROCEDURE — 80048 BASIC METABOLIC PNL TOTAL CA: CPT

## 2019-10-02 PROCEDURE — 94760 N-INVAS EAR/PLS OXIMETRY 1: CPT

## 2019-10-02 PROCEDURE — 85018 HEMOGLOBIN: CPT

## 2019-10-02 RX ORDER — CYCLOBENZAPRINE HCL 10 MG
5 TABLET ORAL
Status: DISCONTINUED | OUTPATIENT
Start: 2019-10-02 | End: 2019-10-02 | Stop reason: HOSPADM

## 2019-10-02 RX ORDER — DOCUSATE SODIUM 100 MG/1
100 CAPSULE, LIQUID FILLED ORAL 2 TIMES DAILY
Qty: 60 CAP | Refills: 0 | Status: SHIPPED | OUTPATIENT
Start: 2019-10-02

## 2019-10-02 RX ORDER — OXYCODONE AND ACETAMINOPHEN 5; 325 MG/1; MG/1
1-2 TABLET ORAL
Qty: 50 TAB | Refills: 0 | Status: SHIPPED | OUTPATIENT
Start: 2019-10-02 | End: 2019-10-09

## 2019-10-02 RX ORDER — CYCLOBENZAPRINE HCL 10 MG
10 TABLET ORAL
Qty: 30 TAB | Refills: 0 | Status: SHIPPED | OUTPATIENT
Start: 2019-10-02

## 2019-10-02 RX ADMIN — METOPROLOL TARTRATE 25 MG: 25 TABLET ORAL at 08:01

## 2019-10-02 RX ADMIN — POLYETHYLENE GLYCOL 3350 17 G: 17 POWDER, FOR SOLUTION ORAL at 08:01

## 2019-10-02 RX ADMIN — CHOLECALCIFEROL TAB 125 MCG (5000 UNIT) 10000 UNITS: 125 TAB at 08:01

## 2019-10-02 RX ADMIN — FAMOTIDINE 20 MG: 20 TABLET ORAL at 08:01

## 2019-10-02 RX ADMIN — Medication 1500 MG: at 08:01

## 2019-10-02 RX ADMIN — OXYCODONE HYDROCHLORIDE 5 MG: 5 TABLET ORAL at 08:01

## 2019-10-02 RX ADMIN — PANTOPRAZOLE SODIUM 40 MG: 40 TABLET, DELAYED RELEASE ORAL at 06:43

## 2019-10-02 RX ADMIN — Medication 10 ML: at 06:43

## 2019-10-02 RX ADMIN — CYCLOBENZAPRINE HYDROCHLORIDE 5 MG: 10 TABLET, FILM COATED ORAL at 09:27

## 2019-10-02 RX ADMIN — SENNOSIDES, DOCUSATE SODIUM 1 TABLET: 50; 8.6 TABLET, FILM COATED ORAL at 08:01

## 2019-10-02 RX ADMIN — OXYCODONE HYDROCHLORIDE 5 MG: 5 TABLET ORAL at 11:40

## 2019-10-02 NOTE — PROGRESS NOTES
CMS Note  10/02/2019    Patient received and signed both Observation and MOON letter. Patient was given copies for their record.   Sana Martínez CMS

## 2019-10-02 NOTE — PROGRESS NOTES
Patient requesting flu shot prior to discharge. Spoke to Consuelo Rod NP.  Patient can not have until a week after surgery

## 2019-10-02 NOTE — PROGRESS NOTES
Bedside shift change report given to Lisa Kerns RN (oncoming nurse) by Cleo Gitelman, RN (offgoing nurse). Report included the following information SBAR, Kardex, Intake/Output and MAR.

## 2019-10-02 NOTE — PROGRESS NOTES
Received discharge instructions and 3 scripts ,including Percocet and Flexeril, Which have all been read and explained to her with her  at her side.  Verbalized understanding

## 2019-10-02 NOTE — PROGRESS NOTES
10/2/2019  11:28 AM  Reason for Admission:   Cervical Stenosis, Cervical Fusion  C4-C5 and C5-C6   pt is 78 yo  female who chronic neck pain that has not responded to conservative therapies, for surgical.                 RRAT Score:      10   Low Risk/ Green               Plan for utilizing home health:      No history, none indicated for this admission, pt is not homebound                    Current Advanced Directive/Advance Care Plan:  Pt Bravo Chavez (C) [phone] is surrogate                         Transition of Care Plan:                    [address] admission OBS for surgical management  2. CM to follow  3. ACP Planning  4. D/C when stable to home w/ family assistance and ortho f/u  5.  will transport    Care Management Interventions  PCP Verified by CM: Yes(Reyna An NP, no NN)  Palliative Care Criteria Met (RRAT>21 & CHF Dx)?: No  Mode of Transport at Discharge: Self()  Discharge Durable Medical Equipment: No  Physical Therapy Consult: No  Occupational Therapy Consult: No  Current Support Network: Lives with Spouse, Own Home(pt lives w/  in pvt residence, reports to be ambulatory, independent ADLs and drives)  Confirm Follow Up Transport: Family  Discharge Location  Discharge Placement: Home with outpatient services      CM met w/ pt and  [place] for d/c planning, charted demographics verified, pt lives w/  in 1 story home w/ 3 entry steps, and handrails. PTA reports to be ambulatory, iADLs and jenny,  can transport to all f/u. PCP; Nik Layne, NO, no NN  DME: None  Rx: coverage through [facility] fills at [facility]    There are no d/c needs.     Sydney Fall

## 2019-10-02 NOTE — DISCHARGE INSTRUCTIONS
Tali Guerrero MD  [address] [facility]  Office Phone: [phone]  Neck Surgery Discharge Instructions  Activities   You are going home a well person, be as active as possible. Your only exercise should be walking. Start with short frequent walks and increase your walking distance each day. Start with walking twice a day for 5 minutes and increase your distance each day 2-3 minutes until you reach 25 minutes twice a day. Limit the amount of time you sit to 20-30 minute intervals. Sitting for prolonged periods of time will be uncomfortable for you following your surgery.  Do not lift anything over five pounds, and do not do any bending or straining.  Avoid reaching overhead in this post-operative period   Do not do any neck exercises until you have been instructed by your doctor.  When you are in the bed, you may lay on your back or on either side. Do not lie on your stomach.  Continue using your incentive spirometer regularly for deep breathing exercises   You may resume sexual relations 3-4 weeks after your surgery, depending on how you are feeling. Diet   You may resume your normal diet. If your throat is sore, you may want to eat soft foods for a few days. Be sure to drink plenty of fluids, it is important to keep yourself hydrated. If you begin having trouble swallowing, call the office immediately.  Avoid alcoholic beverages and ABSOLUTELY NO tobacco products. Tobacco products will interfere with your healing. If you continue to use tobacco, you may end up needing another surgery in the future. Medications   Do not take anti-inflammatory medications or aspirin unless instructed by your physician.  Take your pain medication as directed.  Do NOT take additional Tylenol if your prescribed pain medication has acetaminophen in it (Endocet/Percocet, Lortab, Norco).  It is important to have regular bowel movements. Pain medications may cause constipation.   Stool softeners, prune juice, and increasing your water and fiber intake may help in preventing constipation.  Do NOT take laxatives if at all possible except in severe situations. It can results in a vicious cycle of constipation and diarrhea.  Do not be alarmed if you still have some of the same symptoms you had prior to surgery. The nerves often require time to heal after the pressure has been relieved. You may experience pain in your shoulders or between your shoulder blades, which is common after this surgery. The level of pain you experience should improve as your body heals. Driving   You may not drive or return to work until instructed by your physician. However, you may ride in the car for short periods of time. Neck collar   Wear your neck brace. You may remove it for short breaks, when eating or showering. You must keep the brace on while sleeping and when ambulating. Showering   You may shower in approximately 5 days after your surgery if your incision is not draining.  You may remove your brace during showers.  Do not rub or apply lotion or ointments to the incision site.  Do not use tub baths, swimming pools or Jacuzzis. Caring for your incision   Keep the clear, plastic dressing on until 3 days after surgery. At that point, if the incision is dry and without drainage, you may keep the wound open to air without cover.  You may have steri-strips on your incision (small, white pieces of tape). Do not pull the steri-strips; they will fall off on their own after several days. If you have sutures or staples, they will be removed by home health or when you see your physician.  Do not rub or apply any lotions or ointments to your incision site. Follow Up   Once you are home, call your physicians office to schedule an appointment 2-3 weeks after surgery. Notify your physician if you develop any of the following conditions:   Fever above 101 degrees for 24 hours.    Nausea or vomiting.  Severe headache.  Inability to urinate.  Loss of bowel or bladder control (sudden onset of incontinence).  Changes in sensation in your extremities (numbness, tingling, loss of color).  Severe pain or pain not relieved by medications.  Redness, swelling, or drainage from your incision.  Persistent pain in the chest.    Pain in the calf of either leg.  Increased weakness (if this is greater than before your surgery). If you have any questions, contact your Orthopaedic Surgeons office. OFFICE OF DR. Jacqueline Saunders   [phone]  [place] [address], [address], [address], [address] [facility] [address]     * WEAR YOUR BRACE AS ADVISED    * NO DRIVING UNTIL YOU ARE CLEARED TO DO SO BY YOUR SURGEON    * LIMIT LIFTING, BENDING AND TWISTING.  NO LIFTING MORE THAN 5 LBS    * MAKE SURE YOU ARE GETTING GOOD NUTRITION (Lean Protein, Vitamin D AND Calcium)    * DO NOT TAKE ANY NSAIDs FOR THE FIRST 3 MONTHS AFTER SURGERY (such as Advil/Ibuprofen/Motrin, Aleve/Naproxen/Naprosyn, Diclofenac, Celebrex, Meloxicam, Indomethacin, Goody's powder, BC powder etc.)    * NO NICOTINE PRODUCTS    * FULLY READ YOUR DISCHARGE INSTRUCTIONS

## 2019-10-02 NOTE — PROGRESS NOTES
ORTHOPAEDIC CERVICAL FUSION PROGRESS NOTE    NAME:     Karon Galan   :       1950   MRN:       [id number]   DATE:      10/2/2019    POD:              1 Day Post-Op  S/P:              Procedure(s):  C4-6 ANTERIOR CERVICAL DISCECTOMY AND FUSION WITH INSTRUMENTATION    SUBJECTIVE:  C/O sore throat   No arm pain or numbness  Denies nausea/vomiting, headache, chest pain or shortness of breath  Pain controlled    Recent Labs     10/02/19  1011 10/01/19  1021   HGB 11.1* 13.6   HCT  --  41.8   *  --    K 3.8  --      --    CO2 29  --    BUN 10  --    CREA 0.59  --    *  --      Patient Vitals for the past 12 hrs:   BP Temp Pulse Resp SpO2   10/02/19 1127 123/76 97.8 °F (36.6 °C) 62 16 97 %   10/02/19 0823 116/74 97.2 °F (36.2 °C) 91 16 94 %   10/02/19 0337 115/72 97.9 °F (36.6 °C) 64 16 96 %       Exam:  VISTA collar inplace / intact  Dressings clean and dry  Positive strength/ROM bilat upper ext.   Neuro intact to sensation  BL UEs NVID    PLAN:  Continue PO pain medications as needed  Chloraseptic spray at bedside  Advance diet as tolerated  Out of bed w/ assist  Likely D/C to home today      Jennifer Brar [place]  Orthopaedic Surgery  Physician Assistant to Dr. Kimmy Martínez

## 2019-10-13 NOTE — DISCHARGE SUMMARY
DISCHARGE SUMMARY     Patient: Noemy Ramos [facility] Record Number: [id number]                : 1950  Age: 77 y.o. Admit Date: 10/1/2019  Discharge Date: 10/2/2019  Admission Diagnosis: Cervical stenosis of spinal canal [M48.02]  Discharge Diagnosis: STENOSIS of Anterior cervical spine C4-C6  Procedures: Procedure(s):  C4-6 ANTERIOR CERVICAL DISCECTOMY AND FUSION WITH INSTRUMENTATION  Surgeon: Rudi Reyes MD  Anesthesia: General  Complications: None     History of Present Illness:  Sharan Robles is a 77 y.o. female with a history of intractable neck pain and radiculopathy. Despite conservative management and after clinical and radiographic evaluation, it was determined that she suffered from cervical stenosis and would benefit from Procedure(s):  C4-6 ANTERIOR CERVICAL DISCECTOMY AND FUSION WITH INSTRUMENTATION, which she consented to undergo after a discussion of the risks, benefits, alternatives, rehab concerns, and potential complications of surgery. Hospital Course:  Sharan Robles tolerated the procedure well. She was transferred  to the recovery room in stable condition. After a brief stay, the patient was then transferred to the Orthopedic floor. On postoperative day #1, the dressing was clean and dry and she was neurovascularly intact. The patient was afebrile and vital signs were stable. Sharan Robles was deemed able to be discharged to Home  in stable condition on postoperative day 1. She was provided with routine postoperative instructions and advised to follow up in my office in 3 weeks following discharge from the hospital.  She was given a brace and prescriptions for medication to control post-operative symptoms.     Discharge Medications:  Discharge Medication List as of 10/2/2019 11:43 AM      START taking these medications    Details   oxyCODONE-acetaminophen (PERCOCET) 5-325 mg per tablet Take 1-2 Tabs by mouth every six (6) hours as needed for Pain for up to 7 days. Max Daily Amount: 8 Tabs. Indications: pain, Print, Disp-50 Tab, R-0      docusate sodium (COLACE) 100 mg capsule Take 1 Cap by mouth two (2) times a day., Print, Disp-60 Cap, R-0      cyclobenzaprine (FLEXERIL) 10 mg tablet Take 1 Tab by mouth three (3) times daily as needed for Muscle Spasm(s). , Print, Disp-30 Tab, R-0         CONTINUE these medications which have NOT CHANGED    Details   cholecalciferol, VITAMIN D3, (VITAMIN D3) 5,000 unit tab tablet Take 2 Tabs by mouth daily for 30 days. Indications: vitamin D deficiency, Normal, Disp-60 Tab, R-0      hydroxychloroquine (PLAQUENIL) 200 mg tablet Take 200 mg by mouth daily. , Historical Med      metoprolol tartrate (LOPRESSOR) 25 mg tablet Take  by mouth two (2) times a day., Historical Med      esomeprazole (NEXIUM) 40 mg capsule Take 40 mg by mouth daily.   Historical Med, 40 mg             [address] [place] [address]  10/2/2019  Orthopaedic Surgery  Physician Assistant to Dr. Moi Persaud

## 2020-01-07 ENCOUNTER — HOSPITAL ENCOUNTER (OUTPATIENT)
Dept: MRI IMAGING | Age: 70
Discharge: HOME | End: 2020-01-07
Attending: ORTHOPAEDIC SURGERY
Payer: MEDICARE

## 2020-01-07 DIAGNOSIS — M54.16 LUMBAR RADICULOPATHY: ICD-10-CM

## 2020-01-07 PROCEDURE — 72148 MRI LUMBAR SPINE W/O DYE: CPT

## 2021-01-26 ENCOUNTER — VIRTUAL VISIT (OUTPATIENT)
Dept: ONCOLOGY | Age: 71
End: 2021-01-26
Payer: MEDICARE

## 2021-01-26 DIAGNOSIS — D75.839 THROMBOCYTOSIS: ICD-10-CM

## 2021-01-26 DIAGNOSIS — D72.828 OTHER ELEVATED WHITE BLOOD CELL (WBC) COUNT: ICD-10-CM

## 2021-01-26 DIAGNOSIS — D72.828 OTHER ELEVATED WHITE BLOOD CELL (WBC) COUNT: Primary | ICD-10-CM

## 2021-01-26 DIAGNOSIS — M06.9 RHEUMATOID ARTHRITIS, INVOLVING UNSPECIFIED SITE, UNSPECIFIED WHETHER RHEUMATOID FACTOR PRESENT (HCC): ICD-10-CM

## 2021-01-26 PROCEDURE — 1090F PRES/ABSN URINE INCON ASSESS: CPT | Performed by: INTERNAL MEDICINE

## 2021-01-26 PROCEDURE — 99204 OFFICE O/P NEW MOD 45 MIN: CPT | Performed by: INTERNAL MEDICINE

## 2021-01-26 RX ORDER — GLUCOSAMINE SULFATE 1500 MG
POWDER IN PACKET (EA) ORAL DAILY
COMMUNITY

## 2021-01-26 NOTE — PROGRESS NOTES
Chief Complaint   Patient presents with    New Patient     Shama Neither is a pleasant 72year old female who presents today as a new patient for low white blood count.  She denies pain today

## 2021-01-26 NOTE — PROGRESS NOTES
[id number] [facility]  [phone]    Hematology / Oncology Consult    Reason for Visit:   Calista Stevens is a 72 y.o. female who is seen by synchronous (real-time) audio-video technology on 2021 in consultation at the request of MARY Salinas for evaluation of leukocytosis. History of Present Illness:   Calista Stevens is a 72 y.o. female with RA, SLE, GERD referred for evaluation of leukocytosis. Patient states she has had WBC elevation for several. No h/o recurrent infections, antibiotics. Has not been on steroids for several years (probably not since .) For RA, she is on Placquenil. No chronic rashes or yeast infections. She states her vitiligo is getting worse, but no significant pruritus. She gets more SOB easily than prior, but is unsure if this is due to age. Does not exercise regularly. No fevers, chills, but does report chronic night sweats. No unintentional weight loss. No melena/hematochezia.      Past Medical History:   Diagnosis Date    GERD (gastroesophageal reflux disease)     History of blood transfusion     Lupus (HCC)     PONV (postoperative nausea and vomiting)     PVC (premature ventricular contraction)     Rheumatoid arthritis (HCC)     Vitiligo       Past Surgical History:   Procedure Laterality Date    HX BREAST LUMPECTOMY Left     HX  SECTION      x 2    HX HAMMER TOE REPAIR Bilateral     HX HYSTERECTOMY N/A     HX OOPHORECTOMY      HX RHINOPLASTY      x 2    HX ROTATOR CUFF REPAIR Right 2019    HX SPLENECTOMY N/A     HX TENDON / LIGAMENT TRANSPLANT Right     Thumb      Social History     Tobacco Use    Smoking status: Former Smoker     Packs/day: 0.50     Years: 12.00     Pack years: 6.00     Types: Cigarettes     Quit date:      Years since quittin.0    Smokeless tobacco: Never Used   Substance Use Topics    Alcohol use: No      Family History   Problem Relation Age of Onset    Diabetes Mother  Heart Disease Mother     Heart Disease Father     Diabetes Sister     Heart Disease Sister     Diabetes Brother     Stroke Brother 62    Kidney Disease Brother     Diabetes Brother     Heart Disease Brother      Current Outpatient Medications   Medication Sig    cholecalciferol (Vitamin D3) 25 mcg (1,000 unit) cap Take  by mouth daily.  docusate sodium (COLACE) 100 mg capsule Take 1 Cap by mouth two (2) times a day.  cyclobenzaprine (FLEXERIL) 10 mg tablet Take 1 Tab by mouth three (3) times daily as needed for Muscle Spasm(s).  hydroxychloroquine (PLAQUENIL) 200 mg tablet Take 200 mg by mouth daily.  metoprolol tartrate (LOPRESSOR) 25 mg tablet Take  by mouth two (2) times a day.  esomeprazole (NEXIUM) 40 mg capsule Take 40 mg by mouth daily. No current facility-administered medications for this visit. Allergies   Allergen Reactions    Pcn [Penicillins] Anaphylaxis        Review of Systems: A complete review of systems was obtained, negative except as described above. Physical Exam:     Due to this being a TeleHealth evaluation, many elements of the physical examination are unable to be assessed. Constitutional: Appears well-developed and well-nourished in no apparent distress   Mental status: Alert and awake, Oriented to person/place/time, Able to follow commands  Eyes: EOM normal, Sclera normal, No visible ocular discharge  HENT: Normocephalic, atraumatic; Mouth/Throat: Moist mucous membranes, External Ears normal  Neck: No visualized mass  Pulmonary/Chest: Respiratory effort normal, No visualized signs of difficulty breathing or respiratory distress   Musculoskeletal: Normal gait with no signs of ataxia, Normal range of motion of neck  Neurological: No facial asymmetry (Cranial nerve 7 motor function), No gaze palsy  Skin: No significant exanthematous lesions or discoloration noted on facial skin  Psychiatric: Normal affect, normal judgment/insight.  No hallucinations       Results:     Lab Results   Component Value Date/Time    WBC 14.3 (H) 10/01/2019 10:21 AM    HGB 11.1 (L) 10/02/2019 10:11 AM    HCT 41.8 10/01/2019 10:21 AM    PLATELET 558 (H) 37/14/5104 10:21 AM    MCV 92.1 10/01/2019 10:21 AM    ABS. NEUTROPHILS 7.4 10/01/2019 10:21 AM    Hemoglobin (POC) 13.6 10/22/2012 02:17 PM    Hematocrit (POC) 40 10/22/2012 02:17 PM     Lab Results   Component Value Date/Time    Sodium 131 (L) 10/02/2019 10:11 AM    Potassium 3.8 10/02/2019 10:11 AM    Chloride 101 10/02/2019 10:11 AM    CO2 29 10/02/2019 10:11 AM    Glucose 108 (H) 10/02/2019 10:11 AM    BUN 10 10/02/2019 10:11 AM    Creatinine 0.59 10/02/2019 10:11 AM    GFR est AA >60 10/02/2019 10:11 AM    GFR est non-AA >60 10/02/2019 10:11 AM    Calcium 8.1 (L) 10/02/2019 10:11 AM    Sodium (POC) 139 10/22/2012 02:17 PM    Potassium (POC) 3.8 10/22/2012 02:17 PM    Chloride (POC) 102 10/22/2012 02:17 PM    Glucose (POC) 87 10/22/2012 02:17 PM    BUN (POC) 8 (L) 10/22/2012 02:17 PM    Creatinine (POC) 0.6 10/22/2012 02:17 PM    Calcium, ionized (POC) 1.14 10/22/2012 02:17 PM     Lab Results   Component Value Date/Time    Bilirubin, total 0.4 09/24/2019 01:07 PM    ALT (SGPT) 12 09/24/2019 01:07 PM    Alk. phosphatase 100 09/24/2019 01:07 PM    Protein, total 7.9 09/24/2019 01:07 PM    Albumin 4.0 09/24/2019 01:07 PM    Globulin 3.9 09/24/2019 01:07 PM     No results found for: IRON, FE, TIBC, IBCT, PSAT, FERR    No results found for: B12LT, FOL, RBCF  No results found for: TSH, TSH2, TSH3, TSHP, TSHEXT  No results found for: HAMAT, HAAB, HABT, HAAT, HBSAG, HBSB, HBSAT, HBABN, HBCM, HBCAB, HBCAT, XBCABS, HBEAB, HBEAG, XHEPCS, [id number], HBEGLT, HBCMLT, HBCLT, HBEBLT, [id number], [id number], HAVMLT, [id number], HBCMLT, [id number], HCGAT    Outside labs 12/31/20:   WBC 13.2, Hgb 13.4, MCV 89, , ANC 5.6, ALC 5.6, Abs eos 0.6, Abs monos 1.3    Imaging:     Radiology report(s) reviewed.     Assessment & Plan:   Bethany Frye Justine Peralta is a 72 y.o. female with RA is seen for chronic leukocytosis. 1. Chronic leukocytosis:   Based on review of prior labs, pt has had leukocytosis for almost 10 yrs or more. Unclear if this is reactive due to underlying inflammation. Do not suspect infection. Given chronicity and multiple types of WBCs elevated, should rule out CML and other myeloproliferative disorders. These disorders can be treated if needed. Not on chronic steroids. No h/o splenectomy. No associated cytopenias to suspect an aggressive blood disorder. -- BCR/ABL1, JAK2, ESR, CRP, peripheral smear  -- Return in 2.5 -3 weeks to review results    2. Thrombocytosis:  Unclear if this is reactive due to underlying inflammation or related to a myeloproliferative disorder. Workup as above. 3. Rheumatoid arthritis:   On Placquenil and followed closely by Dr. Elizabeth Reyes in Rheumatology. No recent steroids. I appreciate the opportunity to participate in Ms. Linda Walters's care. I was in the office while conducting this encounter. The patient was at her at home    Consent:  She and/or her healthcare decision maker is aware that this patient-initiated Telehealth encounter is a billable service, with coverage as determined by her insurance carrier. She is aware that she may receive a bill and has provided verbal consent to proceed: Yes    Pursuant to the emergency declaration under the [address] and the [facility], [address] waiver authority and the [employer] Act, this Virtual  Visit was conducted, with patient's (and/or legal guardian's) consent, to reduce the patient's risk of exposure to COVID-19 and provide necessary medical care. Services were provided through a video synchronous discussion virtually to substitute for in-person visit.       Signed By: Fanta Mei MD     January 26, 2021

## 2021-01-28 ENCOUNTER — HOSPITAL ENCOUNTER (OUTPATIENT)
Dept: LAB | Age: 71
Discharge: HOME | End: 2021-01-28
Payer: MEDICARE

## 2021-01-28 PROCEDURE — 83615 LACTATE (LD) (LDH) ENZYME: CPT

## 2021-01-28 PROCEDURE — 88271 CYTOGENETICS DNA PROBE: CPT

## 2021-01-28 PROCEDURE — 85651 RBC SED RATE NONAUTOMATED: CPT

## 2021-01-28 PROCEDURE — 88275 CYTOGENETICS 100-300: CPT

## 2021-01-28 PROCEDURE — 85025 COMPLETE CBC W/AUTO DIFF WBC: CPT

## 2021-01-28 PROCEDURE — 86140 C-REACTIVE PROTEIN: CPT

## 2021-01-28 PROCEDURE — 36415 COLL VENOUS BLD VENIPUNCTURE: CPT

## 2021-01-28 PROCEDURE — 88237 TISSUE CULTURE BONE MARROW: CPT

## 2021-02-03 LAB
BASOPHILS # BLD AUTO: 0.1 X10E3/UL (ref 0–0.2)
BASOPHILS NFR BLD AUTO: 1 %
CELLS ANALYZED: 200
CELLS COUNTED: 200
CHROM ANALY RESULT (ISCN): NORMAL
CLINICAL CYTOGENETICIST SPEC: NORMAL
CRP SERPL-MCNC: 4 MG/L (ref 0–10)
DIAGNOSTIC IMP SPEC-IMP: NORMAL
EOSINOPHIL # BLD AUTO: 0.6 X10E3/UL (ref 0–0.4)
EOSINOPHIL NFR BLD AUTO: 5 %
ERYTHROCYTE [DISTWIDTH] IN BLOOD BY AUTOMATED COUNT: 13.2 % (ref 11.7–15.4)
ERYTHROCYTE [SEDIMENTATION RATE] IN BLOOD BY WESTERGREN METHOD: 12 MM/HR (ref 0–40)
HCT VFR BLD AUTO: 39.5 % (ref 34–46.6)
HGB BLD-MCNC: 13.7 G/DL (ref 11.1–15.9)
IMM GRANULOCYTES # BLD AUTO: 0 X10E3/UL (ref 0–0.1)
IMM GRANULOCYTES NFR BLD AUTO: 0 %
LDH SERPL-CCNC: 205 IU/L (ref 119–226)
LYMPHOCYTES # BLD AUTO: 5.2 X10E3/UL (ref 0.7–3.1)
LYMPHOCYTES NFR BLD AUTO: 41 %
MCH RBC QN AUTO: 30 PG (ref 26.6–33)
MCHC RBC AUTO-ENTMCNC: 34.7 G/DL (ref 31.5–35.7)
MCV RBC AUTO: 87 FL (ref 79–97)
MONOCYTES # BLD AUTO: 1.5 X10E3/UL (ref 0.1–0.9)
MONOCYTES NFR BLD AUTO: 12 %
NEUTROPHILS # BLD AUTO: 5.3 X10E3/UL (ref 1.4–7)
NEUTROPHILS NFR BLD AUTO: 41 %
PLATELET # BLD AUTO: 475 X10E3/UL (ref 150–450)
RBC # BLD AUTO: 4.56 X10E6/UL (ref 3.77–5.28)
SPECIMEN SOURCE: NORMAL
WBC # BLD AUTO: 12.8 X10E3/UL (ref 3.4–10.8)

## 2021-02-16 ENCOUNTER — TELEPHONE (OUTPATIENT)
Dept: ONCOLOGY | Age: 71
End: 2021-02-16

## 2021-02-16 ENCOUNTER — VIRTUAL VISIT (OUTPATIENT)
Dept: ONCOLOGY | Age: 71
End: 2021-02-16
Payer: MEDICARE

## 2021-02-16 DIAGNOSIS — D72.828 OTHER ELEVATED WHITE BLOOD CELL (WBC) COUNT: ICD-10-CM

## 2021-02-16 DIAGNOSIS — D72.820 LYMPHOCYTOSIS: ICD-10-CM

## 2021-02-16 DIAGNOSIS — D72.828 OTHER ELEVATED WHITE BLOOD CELL (WBC) COUNT: Primary | ICD-10-CM

## 2021-02-16 DIAGNOSIS — M06.9 RHEUMATOID ARTHRITIS, INVOLVING UNSPECIFIED SITE, UNSPECIFIED WHETHER RHEUMATOID FACTOR PRESENT (HCC): ICD-10-CM

## 2021-02-16 DIAGNOSIS — D75.839 THROMBOCYTOSIS: ICD-10-CM

## 2021-02-16 PROCEDURE — G8400 PT W/DXA NO RESULTS DOC: HCPCS | Performed by: INTERNAL MEDICINE

## 2021-02-16 PROCEDURE — 3017F COLORECTAL CA SCREEN DOC REV: CPT | Performed by: INTERNAL MEDICINE

## 2021-02-16 PROCEDURE — G0463 HOSPITAL OUTPT CLINIC VISIT: HCPCS | Performed by: INTERNAL MEDICINE

## 2021-02-16 PROCEDURE — 1101F PT FALLS ASSESS-DOCD LE1/YR: CPT | Performed by: INTERNAL MEDICINE

## 2021-02-16 PROCEDURE — G8510 SCR DEP NEG, NO PLAN REQD: HCPCS | Performed by: INTERNAL MEDICINE

## 2021-02-16 PROCEDURE — 1090F PRES/ABSN URINE INCON ASSESS: CPT | Performed by: INTERNAL MEDICINE

## 2021-02-16 PROCEDURE — G8427 DOCREV CUR MEDS BY ELIG CLIN: HCPCS | Performed by: INTERNAL MEDICINE

## 2021-02-16 PROCEDURE — 99213 OFFICE O/P EST LOW 20 MIN: CPT | Performed by: INTERNAL MEDICINE

## 2021-02-16 NOTE — PROGRESS NOTES
[address] [facility] Oncology at [address] [place] [address]  [phone]    Hematology / Oncology Established Visit    Reason for Visit:   Gwendolyn Rajput is a 72 y.o. female who is seen by synchronous (real-time) audio-video technology on 2021 for follow up of leukocytosis. Initially referred by MARY Whitlokc. History of Present Illness:   Gwendolyn Rajput is a 72 y.o. female with RA, SLE, GERD initially referred for evaluation of leukocytosis. Patient states she has had WBC elevation for several. No h/o recurrent infections, antibiotics. Has not been on steroids for several years (probably not since .) For RA, she is on Placquenil. No chronic rashes or yeast infections. She states her vitiligo is getting worse, but no significant pruritus. She gets more SOB easily than prior, but is unsure if this is due to age. Does not exercise regularly. No fevers, chills, but does report chronic night sweats. No unintentional weight loss. No melena/hematochezia. Interval History:  Pt is seen for follow up of lab work. She is feeling well overall aside from her chronic joint pains. No fevers, chills.      Past Medical History:   Diagnosis Date    GERD (gastroesophageal reflux disease)     History of blood transfusion     Lupus (HCC)     PONV (postoperative nausea and vomiting)     PVC (premature ventricular contraction)     Rheumatoid arthritis (HCC)     Vitiligo       Past Surgical History:   Procedure Laterality Date    HX BREAST LUMPECTOMY Left     HX  SECTION      x 2    HX HAMMER TOE REPAIR Bilateral     HX HYSTERECTOMY N/A     HX OOPHORECTOMY      HX RHINOPLASTY      x 2    HX ROTATOR CUFF REPAIR Right 2019    HX SPLENECTOMY N/A     HX TENDON / LIGAMENT TRANSPLANT Right     Thumb      Social History     Tobacco Use    Smoking status: Former Smoker     Packs/day: 0.50     Years: 12.00     Pack years: 6.00     Types: Cigarettes     Quit date:      Years since quittin.1  Smokeless tobacco: Never Used   Substance Use Topics    Alcohol use: No      Family History   Problem Relation Age of Onset    Diabetes Mother     Heart Disease Mother     Heart Disease Father     Diabetes Sister     Heart Disease Sister     Diabetes Brother     Stroke Brother 62    Kidney Disease Brother     Diabetes Brother     Heart Disease Brother      Current Outpatient Medications   Medication Sig    cholecalciferol (Vitamin D3) 25 mcg (1,000 unit) cap Take  by mouth daily.  docusate sodium (COLACE) 100 mg capsule Take 1 Cap by mouth two (2) times a day.  cyclobenzaprine (FLEXERIL) 10 mg tablet Take 1 Tab by mouth three (3) times daily as needed for Muscle Spasm(s).  hydroxychloroquine (PLAQUENIL) 200 mg tablet Take 200 mg by mouth daily.  metoprolol tartrate (LOPRESSOR) 25 mg tablet Take  by mouth two (2) times a day.  esomeprazole (NEXIUM) 40 mg capsule Take 40 mg by mouth daily. No current facility-administered medications for this visit. Allergies   Allergen Reactions    Pcn [Penicillins] Anaphylaxis        Review of Systems: A complete review of systems was obtained, negative except as described above. Physical Exam:     Due to this being a TeleHealth evaluation, many elements of the physical examination are unable to be assessed.      Constitutional: Appears well-developed and well-nourished in no apparent distress   Mental status: Alert and awake, Oriented to person/place/time, Able to follow commands  Eyes: EOM normal, Sclera normal, No visible ocular discharge  HENT: Normocephalic, atraumatic; Mouth/Throat: Moist mucous membranes, External Ears normal  Neck: No visualized mass  Pulmonary/Chest: Respiratory effort normal, No visualized signs of difficulty breathing or respiratory distress   Musculoskeletal: Normal gait with no signs of ataxia, Normal range of motion of neck  Neurological: No facial asymmetry (Cranial nerve 7 motor function), No gaze palsy  Skin: No significant exanthematous lesions or discoloration noted on facial skin  Psychiatric: Normal affect, normal judgment/insight. No hallucinations       Results:     Lab Results   Component Value Date/Time    WBC 12.8 (H) 01/28/2021 11:13 AM    HGB 13.7 01/28/2021 11:13 AM    HCT 39.5 01/28/2021 11:13 AM    PLATELET 973 (H) 25/33/5606 11:13 AM    MCV 87 01/28/2021 11:13 AM    ABS. NEUTROPHILS 5.3 01/28/2021 11:13 AM    Hemoglobin (POC) 13.6 10/22/2012 02:17 PM    Hematocrit (POC) 40 10/22/2012 02:17 PM     Lab Results   Component Value Date/Time    Sodium 131 (L) 10/02/2019 10:11 AM    Potassium 3.8 10/02/2019 10:11 AM    Chloride 101 10/02/2019 10:11 AM    CO2 29 10/02/2019 10:11 AM    Glucose 108 (H) 10/02/2019 10:11 AM    BUN 10 10/02/2019 10:11 AM    Creatinine 0.59 10/02/2019 10:11 AM    GFR est AA >60 10/02/2019 10:11 AM    GFR est non-AA >60 10/02/2019 10:11 AM    Calcium 8.1 (L) 10/02/2019 10:11 AM    Sodium (POC) 139 10/22/2012 02:17 PM    Potassium (POC) 3.8 10/22/2012 02:17 PM    Chloride (POC) 102 10/22/2012 02:17 PM    Glucose (POC) 87 10/22/2012 02:17 PM    BUN (POC) 8 (L) 10/22/2012 02:17 PM    Creatinine (POC) 0.6 10/22/2012 02:17 PM    Calcium, ionized (POC) 1.14 10/22/2012 02:17 PM     Lab Results   Component Value Date/Time    Bilirubin, total 0.4 09/24/2019 01:07 PM    ALT (SGPT) 12 09/24/2019 01:07 PM    Alk.  phosphatase 100 09/24/2019 01:07 PM    Protein, total 7.9 09/24/2019 01:07 PM    Albumin 4.0 09/24/2019 01:07 PM    Globulin 3.9 09/24/2019 01:07 PM     No results found for: IRON, FE, TIBC, IBCT, PSAT, FERR    No results found for: B12LT, FOL, RBCF  No results found for: TSH, TSH2, TSH3, TSHP, TSHEXT, TSHEXT  No results found for: HAMAT, HAAB, HABT, HAAT, HBSAG, HBSB, HBSAT, HBABN, HBCM, HBCAB, HBCAT, Jonetta Camera, [address] [facility], [address], [address] [facility], 606[address]746 Desirae Cloud, [address] [facility], [address] [place] [address], [address] [place] [address], [id number], [id number], [address] [place] [address], [id number], [facility], [facility], [id number], HCGAT    Outside labs 12/31/20:   WBC 13.2, Hgb 13.4, MCV 89, , ANC 5.6, ALC 5.6, Abs eos 0.6, Abs monos 1.3      Labs 1/28/21:   CRP 4, ESR 12, , BCR/ABL1 negative    Imaging:     Radiology report(s) reviewed. Assessment & Plan:   Osiris Benitez is a 72 y.o. female with RA is seen for chronic leukocytosis. 1. Chronic leukocytosis:   Based on review of prior labs, pt has had leukocytosis for almost 10 yrs or more. Unclear if this is reactive due to underlying inflammation. Do not suspect infection. Given chronicity and multiple types of WBCs elevated, did evaluate for CML - this diagnosis was ruled out by negative BCR-ABL1 test. Not on chronic steroids. No h/o splenectomy. No associated cytopenias to suspect an aggressive blood disorder. BCR/ABL1 negative, ESR, CRP normal.   -- JAK2 and peripheral smear results not available - lab did not collect correct sample. -- Check immunophenotype (flow cytometry)  -- Will call pt with results. 2. Thrombocytosis:  Unclear if this is reactive due to underlying inflammation or related to a myeloproliferative disorder. Workup as above. -- Check iron profile, ferritin    3. Rheumatoid arthritis:   On Placquenil and followed closely by Dr. Coral Sánchez in Rheumatology. No recent steroids. I appreciate the opportunity to participate in Ms. Linda Walters's care. I was in the office while conducting this encounter. The patient was at her at home    Consent:  She and/or her healthcare decision maker is aware that this patient-initiated Telehealth encounter is a billable service, with coverage as determined by her insurance carrier.  She is aware that she may receive a bill and has provided verbal consent to proceed: Yes    Pursuant to the emergency declaration under the [address] and the [facility], 1135 waiver authority and the [employer] Act, this Virtual  Visit was conducted, with patient's (and/or legal guardian's) consent, to reduce the patient's risk of exposure to COVID-19 and provide necessary medical care. Services were provided through a video synchronous discussion virtually to substitute for in-person visit.       Signed By: Fanta Mei MD     February 16, 2021

## 2021-02-16 NOTE — PROGRESS NOTES
Chief Complaint   Patient presents with   • Follow-up     Linda Romeo is a pleasant 70 year old woman who presents today as a follow up for leukocytosis. She reports hand, knee, and hip pain

## 2021-02-16 NOTE — TELEPHONE ENCOUNTER
Sweetie High at [facility]  [phone]        02/16/21 4:17 PM Called patient and notified her that JAK2 test was not performed. Per Dr. Wong Guardado, requested that patient return to lab for testing, as well as additional labs ordered today. Patient voiced understanding and is agreeable. She will attempt to schedule lab appointment at [facility] tomorrow. Lab order faxed as well.

## 2021-02-17 ENCOUNTER — HOSPITAL ENCOUNTER (OUTPATIENT)
Dept: LAB | Age: 71
Discharge: HOME | End: 2021-02-17
Payer: MEDICARE

## 2021-02-17 PROCEDURE — 88185 FLOWCYTOMETRY/TC ADD-ON: CPT

## 2021-02-17 PROCEDURE — 83550 IRON BINDING TEST: CPT

## 2021-02-17 PROCEDURE — 82728 ASSAY OF FERRITIN: CPT

## 2021-02-17 PROCEDURE — 36415 COLL VENOUS BLD VENIPUNCTURE: CPT

## 2021-02-17 PROCEDURE — 88184 FLOWCYTOMETRY/ TC 1 MARKER: CPT

## 2021-02-22 LAB
ANALYSIS AND GATING STRATEGY: NORMAL
ANNOTATION COMMENT IMP: NORMAL
ASSESSMENT OF LEUKOCYTES: NORMAL
BASOPHILS # BLD AUTO: 0.1 X10E3/UL (ref 0–0.2)
BASOPHILS NFR BLD AUTO: 1 %
CLINICAL INFO: NORMAL
COMMENT , 490046: NORMAL
EOSINOPHIL # BLD AUTO: 0.5 X10E3/UL (ref 0–0.4)
EOSINOPHIL NFR BLD AUTO: 5 %
ERYTHROCYTE [DISTWIDTH] IN BLOOD BY AUTOMATED COUNT: 13.4 % (ref 11.7–15.4)
FERRITIN SERPL-MCNC: 132 NG/ML (ref 15–150)
HCT VFR BLD AUTO: 39.6 % (ref 34–46.6)
HGB BLD-MCNC: 13.8 G/DL (ref 11.1–15.9)
IMM GRANULOCYTES # BLD AUTO: 0 X10E3/UL (ref 0–0.1)
IMM GRANULOCYTES NFR BLD AUTO: 0 %
IMMUNOPHENOTYPING STUDY: NORMAL
IRON SATN MFR SERPL: 33 % (ref 15–55)
IRON SERPL-MCNC: 103 UG/DL (ref 27–139)
LYMPHOCYTES # BLD AUTO: 5.1 X10E3/UL (ref 0.7–3.1)
LYMPHOCYTES NFR BLD AUTO: 46 %
MCH RBC QN AUTO: 30.7 PG (ref 26.6–33)
MCHC RBC AUTO-ENTMCNC: 34.8 G/DL (ref 31.5–35.7)
MCV RBC AUTO: 88 FL (ref 79–97)
MONOCYTES # BLD AUTO: 1.3 X10E3/UL (ref 0.1–0.9)
MONOCYTES NFR BLD AUTO: 12 %
NEUTROPHILS # BLD AUTO: 4.1 X10E3/UL (ref 1.4–7)
NEUTROPHILS NFR BLD AUTO: 36 %
PATH INTERP BLD-IMP: ABNORMAL
PATH INTERP SPEC-IMP: NORMAL
PATH REV BLD -IMP: ABNORMAL
PATHOLOGIST NAME: ABNORMAL
PATHOLOGIST NAME: NORMAL
PLATELET # BLD AUTO: 437 X10E3/UL (ref 150–450)
RBC # BLD AUTO: 4.49 X10E6/UL (ref 3.77–5.28)
SPECIMEN SOURCE: NORMAL
TIBC SERPL-MCNC: 315 UG/DL (ref 250–450)
UIBC SERPL-MCNC: 212 UG/DL (ref 118–369)
VIABLE CELLS NFR SPEC: NORMAL %
WBC # BLD AUTO: 11.2 X10E3/UL (ref 3.4–10.8)

## 2021-03-15 NOTE — PROGRESS NOTES
[address] [facility] Oncology at [facility]  [phone]    Hematology / Oncology Established Visit    Reason for Visit:   Zach Lowe is a 72 y.o. female who is seen by synchronous (real-time) audio-video technology on 3/16/2021 for follow up of leukocytosis. Initially referred by MARY Dangelo. Hematology Oncology Treatment History:     Pathology:   Immunophenotyping profile, peripheral blood: Flow comment:  Lymphocytosis is due to increases in T helper cells, NK cells, and B cells. Increases in multiple lymphocyte subsets may suggest a reactive process. Clinical correlation is recommended. If a persistent absolute monocytosis of 1,000 cells/uL or more is present,   consider evaluation for chronic myelomonocytic leukemia (CMML). Eosinophilia can be associated with drug reaction, parasitic infection, and neoplastic processes, among others. Clinical information  Essential thrombocythemia, Lymphocytosis, Leukocytosis   Accompanying CBC dated 02/17/2021 shows:   WBC count 11.2, Eos% 6, Marli 4.1, Lym 5.1, Mon 1.3, Eos 0.7, Plt 437K     History of Present Illness:   Zach Lowe is a 72 y.o. female with RA, SLE, GERD initially referred for evaluation of leukocytosis. Patient states she has had WBC elevation for several years. No h/o recurrent infections, antibiotics. Has not been on steroids for several years (probably not since 1990s.) For RA, she is on Placquenil. No chronic rashes or yeast infections. She states her vitiligo is getting worse, but no significant pruritus. She gets more SOB easily than prior, but is unsure if this is due to age. Does not exercise regularly. No fevers, chills, but does report chronic night sweats. No unintentional weight loss. No melena/hematochezia. Interval History:  Pt is seen for follow up of lab work. She is feeling well overall aside from her chronic joint pains. Reports night sweats that occur several times during the week.  Reports history of lupus but says she is remission. Reports intermittent ear pain. Reports diagnosis of occipital migraines, in which wavy lines appear in eyes and she develops watery eyes. No fevers, chills or unintentional weight-loss. Past Medical History:   Diagnosis Date    GERD (gastroesophageal reflux disease)     History of blood transfusion     Lupus (HCC)     PONV (postoperative nausea and vomiting)     PVC (premature ventricular contraction)     Rheumatoid arthritis (HCC)     Vitiligo       Past Surgical History:   Procedure Laterality Date    HX BREAST LUMPECTOMY Left     HX  SECTION      x 2    HX HAMMER TOE REPAIR Bilateral     HX HYSTERECTOMY N/A     HX OOPHORECTOMY      HX RHINOPLASTY      x 2    HX ROTATOR CUFF REPAIR Right 2019    HX SPLENECTOMY N/A     HX TENDON / LIGAMENT TRANSPLANT Right     Thumb      Social History     Tobacco Use    Smoking status: Former Smoker     Packs/day: 0.50     Years: 12.00     Pack years: 6.00     Types: Cigarettes     Quit date:      Years since quittin.2    Smokeless tobacco: Never Used   Substance Use Topics    Alcohol use: No      Family History   Problem Relation Age of Onset    Diabetes Mother     Heart Disease Mother     Heart Disease Father     Diabetes Sister     Heart Disease Sister     Diabetes Brother     Stroke Brother 62    Kidney Disease Brother     Diabetes Brother     Heart Disease Brother      Current Outpatient Medications   Medication Sig    cholecalciferol (Vitamin D3) 25 mcg (1,000 unit) cap Take  by mouth daily.  docusate sodium (COLACE) 100 mg capsule Take 1 Cap by mouth two (2) times a day.  cyclobenzaprine (FLEXERIL) 10 mg tablet Take 1 Tab by mouth three (3) times daily as needed for Muscle Spasm(s).  hydroxychloroquine (PLAQUENIL) 200 mg tablet Take 200 mg by mouth daily.  metoprolol tartrate (LOPRESSOR) 25 mg tablet Take  by mouth two (2) times a day.     esomeprazole (NEXIUM) 40 mg capsule Take 40 mg by mouth daily. No current facility-administered medications for this visit. Allergies   Allergen Reactions    Pcn [Penicillins] Anaphylaxis        Review of Systems: A complete review of systems was obtained, negative except as described above. Physical Exam:     Due to this being a TeleHealth evaluation, many elements of the physical examination are unable to be assessed. Constitutional: Appears well-developed and well-nourished in no apparent distress   Mental status: Alert and awake, Oriented to person/place/time, Able to follow commands  Eyes: EOM normal, Sclera normal, No visible ocular discharge  HENT: Normocephalic, atraumatic; Mouth/Throat: Moist mucous membranes, External Ears normal  Neck: No visualized mass  Pulmonary/Chest: Respiratory effort normal, No visualized signs of difficulty breathing or respiratory distress   Musculoskeletal: Normal gait with no signs of ataxia, Normal range of motion of neck  Neurological: No facial asymmetry (Cranial nerve 7 motor function), No gaze palsy  Skin: No significant exanthematous lesions or discoloration noted on facial skin  Psychiatric: Normal affect, normal judgment/insight. No hallucinations       Results:     Lab Results   Component Value Date/Time    WBC 11.2 (H) 02/17/2021 11:54 AM    HGB 13.8 02/17/2021 11:54 AM    HCT 39.6 02/17/2021 11:54 AM    PLATELET 494 62/25/8169 11:54 AM    MCV 88 02/17/2021 11:54 AM    ABS.  NEUTROPHILS 4.1 02/17/2021 11:54 AM    Hemoglobin (POC) 13.6 10/22/2012 02:17 PM    Hematocrit (POC) 40 10/22/2012 02:17 PM     Lab Results   Component Value Date/Time    Sodium 131 (L) 10/02/2019 10:11 AM    Potassium 3.8 10/02/2019 10:11 AM    Chloride 101 10/02/2019 10:11 AM    CO2 29 10/02/2019 10:11 AM    Glucose 108 (H) 10/02/2019 10:11 AM    BUN 10 10/02/2019 10:11 AM    Creatinine 0.59 10/02/2019 10:11 AM    GFR est AA >60 10/02/2019 10:11 AM    GFR est non-AA >60 10/02/2019 10:11 AM    Calcium 8.1 (L) 10/02/2019 10:11 AM    Sodium (POC) 139 10/22/2012 02:17 PM    Potassium (POC) 3.8 10/22/2012 02:17 PM    Chloride (POC) 102 10/22/2012 02:17 PM    Glucose (POC) 87 10/22/2012 02:17 PM    BUN (POC) 8 (L) 10/22/2012 02:17 PM    Creatinine (POC) 0.6 10/22/2012 02:17 PM    Calcium, ionized (POC) 1.14 10/22/2012 02:17 PM     Lab Results   Component Value Date/Time    Bilirubin, total 0.4 09/24/2019 01:07 PM    ALT (SGPT) 12 09/24/2019 01:07 PM    Alk. phosphatase 100 09/24/2019 01:07 PM    Protein, total 7.9 09/24/2019 01:07 PM    Albumin 4.0 09/24/2019 01:07 PM    Globulin 3.9 09/24/2019 01:07 PM     Lab Results   Component Value Date/Time    Iron 103 02/17/2021 11:54 AM    TIBC 315 02/17/2021 11:54 AM    Iron % saturation 33 02/17/2021 11:54 AM    Ferritin 132 02/17/2021 11:54 AM       No results found for: B12LT, FOL, RBCF  No results found for: TSH, TSH2, TSH3, TSHP, TSHEXT, TSHEXT  No results found for: HAMAT, HAAB, HABT, HAAT, HBSAG, HBSB, HBSAT, HBABN, HBCM, HBCAB, HBCAT, XBCABS, HBEAB, HBEAG, XHEPCS, [id number], HBEGLT, HBCMLT, HBCLT, HBEBLT, [id number], [id number], HAVMLT, [id number], HBCMLT, [id number], HCGAT    Outside labs 12/31/20:   WBC 13.2, Hgb 13.4, MCV 89, , ANC 5.6, ALC 5.6, Abs eos 0.6, Abs monos 1.3      Labs 1/28/21:   CRP 4, ESR 12, , BCR/ABL1 negative    Imaging:     Radiology report(s) reviewed. Assessment & Plan:   Keturah Grajeda is a 72 y.o. female with RA is seen for chronic leukocytosis. 1. Chronic leukocytosis:   Based on review of prior labs, pt has had leukocytosis for almost 10 yrs or more. This may be reactive due to underlying inflammation. Do not suspect infection. Given chronicity and multiple types of WBCs elevated, did evaluate for CML - this diagnosis was ruled out by negative BCR-ABL1 test. Not on chronic steroids. No h/o splenectomy. No associated cytopenias to suspect an aggressive blood disorder.  BCR/ABL1 negative, ESR, CRP normal. Flow cytometry is consistent with reactive findings rather than any clonal process. Lab did not complete JAK2 or peripheral smear, and therefore I have reordered this. -- JAK2 and peripheral smear - will call pt with results. -- Repeat CBC in 3 months. 2. H/o thrombocytosis:  Unclear if this is reactive due to underlying inflammation or related to a myeloproliferative disorder. Workup as above. Iron profile and ferritin normal. Resolved with 2/17/21 labs. 3. Rheumatoid arthritis:   On Placquenil and followed closely by Dr. Ewelina Jaramillo in Rheumatology. No recent steroids. I appreciate the opportunity to participate in Ms. Linda Walters's care. I was in the office while conducting this encounter. The patient was at her at home    Consent:  She and/or her healthcare decision maker is aware that this patient-initiated Telehealth encounter is a billable service, with coverage as determined by her insurance carrier. She is aware that she may receive a bill and has provided verbal consent to proceed: Yes    Pursuant to the emergency declaration under the [address] and the [facility], 1135 waiver authority and the [employer] Act, this Virtual  Visit was conducted, with patient's (and/or legal guardian's) consent, to reduce the patient's risk of exposure to COVID-19 and provide necessary medical care. Services were provided through a video synchronous discussion virtually to substitute for in-person visit.

## 2021-03-16 ENCOUNTER — VIRTUAL VISIT (OUTPATIENT)
Dept: ONCOLOGY | Age: 71
End: 2021-03-16
Payer: MEDICARE

## 2021-03-16 DIAGNOSIS — M06.9 RHEUMATOID ARTHRITIS, INVOLVING UNSPECIFIED SITE, UNSPECIFIED WHETHER RHEUMATOID FACTOR PRESENT (HCC): ICD-10-CM

## 2021-03-16 DIAGNOSIS — D72.820 LYMPHOCYTOSIS: Primary | ICD-10-CM

## 2021-03-16 DIAGNOSIS — D72.829 LEUKOCYTOSIS, UNSPECIFIED TYPE: ICD-10-CM

## 2021-03-16 PROCEDURE — 1090F PRES/ABSN URINE INCON ASSESS: CPT | Performed by: INTERNAL MEDICINE

## 2021-03-16 PROCEDURE — 1101F PT FALLS ASSESS-DOCD LE1/YR: CPT | Performed by: INTERNAL MEDICINE

## 2021-03-16 PROCEDURE — G0463 HOSPITAL OUTPT CLINIC VISIT: HCPCS | Performed by: INTERNAL MEDICINE

## 2021-03-16 PROCEDURE — G8432 DEP SCR NOT DOC, RNG: HCPCS | Performed by: INTERNAL MEDICINE

## 2021-03-16 PROCEDURE — 99214 OFFICE O/P EST MOD 30 MIN: CPT | Performed by: INTERNAL MEDICINE

## 2021-03-16 PROCEDURE — 3017F COLORECTAL CA SCREEN DOC REV: CPT | Performed by: INTERNAL MEDICINE

## 2021-03-16 PROCEDURE — G8400 PT W/DXA NO RESULTS DOC: HCPCS | Performed by: INTERNAL MEDICINE

## 2021-03-16 PROCEDURE — G8427 DOCREV CUR MEDS BY ELIG CLIN: HCPCS | Performed by: INTERNAL MEDICINE

## 2021-03-16 NOTE — PROGRESS NOTES
Beatrice Ochoa is a 72 y.o. female here for follow up of leukocytosis. Patient with no complaints of pain at this time.

## 2021-03-17 ENCOUNTER — HOSPITAL ENCOUNTER (OUTPATIENT)
Dept: LAB | Age: 71
Discharge: HOME | End: 2021-03-17
Payer: MEDICARE

## 2021-03-17 PROCEDURE — 36415 COLL VENOUS BLD VENIPUNCTURE: CPT

## 2021-03-24 LAB
BACKGROUND, 081113: NORMAL
BACKGROUND: 480503: NORMAL
BASOPHILS # BLD AUTO: 0.1 X10E3/UL (ref 0–0.2)
BASOPHILS NFR BLD AUTO: 1 %
CALR MUTATION DETECTION RESULT, 489451: NORMAL
EOSINOPHIL # BLD AUTO: 0.5 X10E3/UL (ref 0–0.4)
EOSINOPHIL NFR BLD AUTO: 4 %
ERYTHROCYTE [DISTWIDTH] IN BLOOD BY AUTOMATED COUNT: 13.8 % (ref 11.7–15.4)
EXTRACTION: NORMAL
HCT VFR BLD AUTO: 41.3 % (ref 34–46.6)
HGB BLD-MCNC: 14.4 G/DL (ref 11.1–15.9)
IMM GRANULOCYTES # BLD AUTO: 0 X10E3/UL (ref 0–0.1)
IMM GRANULOCYTES NFR BLD AUTO: 0 %
JAK2 P.V617F BLD/T QL: NORMAL
LAB DIRECTOR NAME PROVIDER: NORMAL
LYMPHOCYTES # BLD AUTO: 6.2 X10E3/UL (ref 0.7–3.1)
LYMPHOCYTES NFR BLD AUTO: 47 %
MCH RBC QN AUTO: 31.2 PG (ref 26.6–33)
MCHC RBC AUTO-ENTMCNC: 34.9 G/DL (ref 31.5–35.7)
MCV RBC AUTO: 89 FL (ref 79–97)
MONOCYTES # BLD AUTO: 1.2 X10E3/UL (ref 0.1–0.9)
MONOCYTES NFR BLD AUTO: 9 %
MPL GENE MUT TESTED BLD/T: NORMAL
MPL P.W515L+W515K+S505N BLD/T QL: NORMAL
NEUTROPHILS # BLD AUTO: 5.1 X10E3/UL (ref 1.4–7)
NEUTROPHILS NFR BLD AUTO: 39 %
PATH INTERP BLD-IMP: ABNORMAL
PATH REV BLD -IMP: ABNORMAL
PATHOLOGIST NAME: ABNORMAL
PLATELET # BLD AUTO: 439 X10E3/UL (ref 150–450)
RBC # BLD AUTO: 4.62 X10E6/UL (ref 3.77–5.28)
REF LAB TEST METHOD: NORMAL
REFERENCES, 150293: NORMAL
REFERENCES, 150293: NORMAL
REFLEX: NORMAL
SERVICE CMNT-IMP: NORMAL
WBC # BLD AUTO: 13.2 X10E3/UL (ref 3.4–10.8)

## 2021-03-25 NOTE — PROGRESS NOTES
Labs from 3/17/21 did not show any gene mutations that can cause the WBC count elevation, which is good. Most likely, the high WBC count is due to inflammation. No evidence of a blood disorder.

## 2021-06-18 LAB
BASOPHILS # BLD AUTO: 0.1 X10E3/UL (ref 0–0.2)
BASOPHILS NFR BLD AUTO: 1 %
EOSINOPHIL # BLD AUTO: 0.7 X10E3/UL (ref 0–0.4)
EOSINOPHIL NFR BLD AUTO: 6 %
ERYTHROCYTE [DISTWIDTH] IN BLOOD BY AUTOMATED COUNT: 13.1 % (ref 11.7–15.4)
HCT VFR BLD AUTO: 41 % (ref 34–46.6)
HGB BLD-MCNC: 13.6 G/DL (ref 11.1–15.9)
IMM GRANULOCYTES # BLD AUTO: 0 X10E3/UL (ref 0–0.1)
IMM GRANULOCYTES NFR BLD AUTO: 0 %
LYMPHOCYTES # BLD AUTO: 5 X10E3/UL (ref 0.7–3.1)
LYMPHOCYTES NFR BLD AUTO: 41 %
MCH RBC QN AUTO: 30 PG (ref 26.6–33)
MCHC RBC AUTO-ENTMCNC: 33.2 G/DL (ref 31.5–35.7)
MCV RBC AUTO: 91 FL (ref 79–97)
MONOCYTES # BLD AUTO: 1.3 X10E3/UL (ref 0.1–0.9)
MONOCYTES NFR BLD AUTO: 10 %
NEUTROPHILS # BLD AUTO: 5.2 X10E3/UL (ref 1.4–7)
NEUTROPHILS NFR BLD AUTO: 42 %
PLATELET # BLD AUTO: 428 X10E3/UL (ref 150–450)
RBC # BLD AUTO: 4.53 X10E6/UL (ref 3.77–5.28)
WBC # BLD AUTO: 12.3 X10E3/UL (ref 3.4–10.8)

## 2021-07-19 NOTE — PROGRESS NOTES
[id number] [facility] at [address] [place] [address]  [phone]    Hematology / Oncology Established Visit    Reason for Visit:   Tanya Michelle is a 80 y.o. female who is seen by synchronous (real-time) audio-video technology on 7/20/2021 for follow up of leukocytosis. Initially referred by MARY Salinas. Hematology Oncology Treatment History:     Pathology:   Immunophenotyping profile, peripheral blood: Flow comment:  Lymphocytosis is due to increases in T helper cells, NK cells, and B cells. Increases in multiple lymphocyte subsets may suggest a reactive process. Clinical correlation is recommended. If a persistent absolute monocytosis of 1,000 cells/uL or more is present,   consider evaluation for chronic myelomonocytic leukemia (CMML). Eosinophilia can be associated with drug reaction, parasitic infection, and neoplastic processes, among others. Clinical information  Essential thrombocythemia, Lymphocytosis, Leukocytosis   Accompanying CBC dated 02/17/2021 shows:   WBC count 11.2, Eos% 6, Marli 4.1, Lym 5.1, Mon 1.3, Eos 0.7, Plt 437K     History of Present Illness:   Tanya Michelle is a 80 y.o. female with RA, SLE, GERD initially referred for evaluation of leukocytosis. Patient states she has had WBC elevation for several years. No h/o recurrent infections, antibiotics. Has not been on steroids for several years (probably not since 1990s.) For RA, she is on Placquenil. No chronic rashes or yeast infections. She states her vitiligo is getting worse, but no significant pruritus. She gets more SOB easily than prior, but is unsure if this is due to age. Does not exercise regularly. No fevers, chills, but does report chronic night sweats. No unintentional weight loss. No melena/hematochezia. Interval History:  Pt is seen for follow up of leukocytosis. No recent infections. Still following with Dr. Justine Mathias. No medication changes or use of corticosteroids.  No enlarged LNs, weight loss, fevers. PMHx: GERD, SLE/RA, PVCs, Vitiligo  Review of Systems: A complete review of systems was obtained, negative except as described above. Physical Exam:     Due to this being a TeleHealth evaluation, many elements of the physical examination are unable to be assessed. Constitutional: Appears well-developed and well-nourished in no apparent distress   Mental status: Alert and awake, Oriented to person/place/time, Able to follow commands  Eyes: EOM normal, Sclera normal, No visible ocular discharge  HENT: Normocephalic, atraumatic; Mouth/Throat: Moist mucous membranes, External Ears normal  Neck: No visualized mass  Pulmonary/Chest: Respiratory effort normal, No visualized signs of difficulty breathing or respiratory distress   Musculoskeletal: Normal gait with no signs of ataxia, Normal range of motion of neck  Neurological: No facial asymmetry (Cranial nerve 7 motor function), No gaze palsy  Skin: No significant exanthematous lesions or discoloration noted on facial skin  Psychiatric: Normal affect, normal judgment/insight. No hallucinations       Results:     Lab Results   Component Value Date/Time    WBC 12.3 (H) 06/17/2021 11:42 AM    HGB 13.6 06/17/2021 11:42 AM    HCT 41.0 06/17/2021 11:42 AM    PLATELET 779 26/00/2133 11:42 AM    MCV 91 06/17/2021 11:42 AM    ABS.  NEUTROPHILS 5.2 06/17/2021 11:42 AM    Hemoglobin (POC) 13.6 10/22/2012 02:17 PM    Hematocrit (POC) 40 10/22/2012 02:17 PM     Lab Results   Component Value Date/Time    Sodium 131 (L) 10/02/2019 10:11 AM    Potassium 3.8 10/02/2019 10:11 AM    Chloride 101 10/02/2019 10:11 AM    CO2 29 10/02/2019 10:11 AM    Glucose 108 (H) 10/02/2019 10:11 AM    BUN 10 10/02/2019 10:11 AM    Creatinine 0.59 10/02/2019 10:11 AM    GFR est AA >60 10/02/2019 10:11 AM    GFR est non-AA >60 10/02/2019 10:11 AM    Calcium 8.1 (L) 10/02/2019 10:11 AM    Sodium (POC) 139 10/22/2012 02:17 PM    Potassium (POC) 3.8 10/22/2012 02:17 PM    Chloride (POC) 102 10/22/2012 02:17 PM    Glucose (POC) 87 10/22/2012 02:17 PM    BUN (POC) 8 (L) 10/22/2012 02:17 PM    Creatinine (POC) 0.6 10/22/2012 02:17 PM    Calcium, ionized (POC) 1.14 10/22/2012 02:17 PM     Lab Results   Component Value Date/Time    Bilirubin, total 0.4 09/24/2019 01:07 PM    ALT (SGPT) 12 09/24/2019 01:07 PM    Alk. phosphatase 100 09/24/2019 01:07 PM    Protein, total 7.9 09/24/2019 01:07 PM    Albumin 4.0 09/24/2019 01:07 PM    Globulin 3.9 09/24/2019 01:07 PM     Lab Results   Component Value Date/Time    Iron 103 02/17/2021 11:54 AM    TIBC 315 02/17/2021 11:54 AM    Iron % saturation 33 02/17/2021 11:54 AM    Ferritin 132 02/17/2021 11:54 AM       No results found for: B12LT, FOL, RBCF  No results found for: TSH, TSH2, TSH3, TSHP, TSHEXT, TSHEXT  No results found for: HAMAT, HAAB, HABT, HAAT, HBSAG, HBSB, HBSAT, HBABN, HBCM, HBCAB, HBCAT, XBCABS, HBEAB, HBEAG, XHEPCS, [id number], HBEGLT, HBCMLT, HBCLT, HBEBLT, [id number], [id number], HAVMLT, [id number], HBCMLT, [id number], HCGAT    Outside labs 12/31/20:   WBC 13.2, Hgb 13.4, MCV 89, , ANC 5.6, ALC 5.6, Abs eos 0.6, Abs monos 1.3      Labs 1/28/21:   CRP 4, ESR 12, , BCR/ABL1 negative    Imaging:     Radiology report(s) reviewed. Assessment & Plan:   Phill Dan is a 80 y.o. female with RA is seen for chronic leukocytosis. 1. Chronic leukocytosis:   Based on review of prior labs, pt has had leukocytosis for almost 10 yrs or more. This may be reactive due to underlying inflammation. Do not suspect infection. Given chronicity and multiple types of WBCs elevated, did evaluate for CML - this diagnosis was ruled out by negative BCR-ABL1 test. No evidence of other myeloproliferative disorders given negative JAK2/CALR/MPL. Prior ESR, CRP normal. Not on chronic steroids. No h/o splenectomy. No associated cytopenias to suspect an aggressive blood disorder. Flow cytometry was consistent with reactive findings rather than any clonal process.  WBC remains mildly elevated but is stable. No further Hematology workup warranted at this time. -- Sending note and prior lab work up to Dr. Khari Nazario. -- Follow up as needed. 2. H/o thrombocytosis:  Was likely reactive due to underlying inflammation and has resolved as of 2/17/21. 3. Rheumatoid arthritis:   On Placquenil and followed closely by Dr. Khari Nazario in Rheumatology. No recent steroids. I appreciate the opportunity to participate in Ms. Linda Walters's care. Total physician time spent on this encounter was 30 minutes. The patient was evaluated through a synchronous (real-time) audio-video encounter. The patient (or guardian if applicable) is aware that this is a billable service. Verbal consent to proceed has been obtained within the past 12 months. The visit was conducted pursuant to the emergency declaration under the [address] [place] [address], [address] [place] [address] authority and the [employer] Act. Patient identification was verified, and a caregiver was present when appropriate. The patient was located in a state where the provider was credentialed to provide care.

## 2021-07-20 ENCOUNTER — VIRTUAL VISIT (OUTPATIENT)
Dept: ONCOLOGY | Age: 71
End: 2021-07-20
Payer: MEDICARE

## 2021-07-20 DIAGNOSIS — D72.829 LEUKOCYTOSIS, UNSPECIFIED TYPE: ICD-10-CM

## 2021-07-20 DIAGNOSIS — M06.9 RHEUMATOID ARTHRITIS, INVOLVING UNSPECIFIED SITE, UNSPECIFIED WHETHER RHEUMATOID FACTOR PRESENT (HCC): ICD-10-CM

## 2021-07-20 DIAGNOSIS — Z86.2 HISTORY OF THROMBOCYTOSIS: ICD-10-CM

## 2021-07-20 DIAGNOSIS — D72.820 LYMPHOCYTOSIS: Primary | ICD-10-CM

## 2021-07-20 PROCEDURE — G0463 HOSPITAL OUTPT CLINIC VISIT: HCPCS | Performed by: INTERNAL MEDICINE

## 2021-07-20 PROCEDURE — G8432 DEP SCR NOT DOC, RNG: HCPCS | Performed by: INTERNAL MEDICINE

## 2021-07-20 PROCEDURE — 3017F COLORECTAL CA SCREEN DOC REV: CPT | Performed by: INTERNAL MEDICINE

## 2021-07-20 PROCEDURE — G8400 PT W/DXA NO RESULTS DOC: HCPCS | Performed by: INTERNAL MEDICINE

## 2021-07-20 PROCEDURE — 99214 OFFICE O/P EST MOD 30 MIN: CPT | Performed by: INTERNAL MEDICINE

## 2021-07-20 PROCEDURE — 1101F PT FALLS ASSESS-DOCD LE1/YR: CPT | Performed by: INTERNAL MEDICINE

## 2021-07-20 PROCEDURE — 1090F PRES/ABSN URINE INCON ASSESS: CPT | Performed by: INTERNAL MEDICINE

## 2021-07-20 PROCEDURE — G8427 DOCREV CUR MEDS BY ELIG CLIN: HCPCS | Performed by: INTERNAL MEDICINE

## 2021-07-20 RX ORDER — METOPROLOL SUCCINATE 25 MG/1
TABLET, EXTENDED RELEASE ORAL
COMMUNITY
Start: 2021-06-17

## 2021-07-20 NOTE — PROGRESS NOTES
Elaina Mccann is a 80 y.o. female here for follow up of leukocytosis. Patient with no complaints of pain at this time.

## 2022-03-19 PROBLEM — M48.02 CERVICAL STENOSIS OF SPINAL CANAL: Status: ACTIVE | Noted: 2019-10-01

## 2023-05-20 RX ORDER — ESOMEPRAZOLE MAGNESIUM 40 MG/1
40 CAPSULE, DELAYED RELEASE ORAL DAILY
COMMUNITY

## 2023-05-20 RX ORDER — PSEUDOEPHEDRINE HCL 30 MG
100 TABLET ORAL 2 TIMES DAILY
COMMUNITY
Start: 2019-10-02

## 2023-05-20 RX ORDER — HYDROXYCHLOROQUINE SULFATE 200 MG/1
200 TABLET, FILM COATED ORAL DAILY
COMMUNITY

## 2023-05-20 RX ORDER — CYCLOBENZAPRINE HCL 10 MG
10 TABLET ORAL 3 TIMES DAILY PRN
COMMUNITY
Start: 2019-10-02

## 2023-05-20 RX ORDER — METOPROLOL SUCCINATE 25 MG/1
1 TABLET, EXTENDED RELEASE ORAL DAILY
COMMUNITY
Start: 2021-06-17

## 2024-01-24 PROBLEM — M81.0 AGE RELATED OSTEOPOROSIS: Status: ACTIVE | Noted: 2024-01-24

## 2024-01-24 PROBLEM — M81.8 OTHER OSTEOPOROSIS WITHOUT CURRENT PATHOLOGICAL FRACTURE: Status: ACTIVE | Noted: 2024-01-24

## 2024-01-24 RX ORDER — ZOLEDRONIC ACID 5 MG/100ML
5 INJECTION, SOLUTION INTRAVENOUS ONCE
OUTPATIENT
Start: 2024-01-31 | End: 2024-01-31

## 2024-01-24 RX ORDER — SODIUM CHLORIDE 9 MG/ML
5-250 INJECTION, SOLUTION INTRAVENOUS PRN
OUTPATIENT
Start: 2024-01-31

## 2024-01-24 RX ORDER — ZOLEDRONIC ACID 5 MG/100ML
5 INJECTION, SOLUTION INTRAVENOUS ONCE
Status: CANCELLED | OUTPATIENT
Start: 2024-01-31 | End: 2024-01-31

## 2024-01-24 RX ORDER — SODIUM CHLORIDE 0.9 % (FLUSH) 0.9 %
5-40 SYRINGE (ML) INJECTION PRN
OUTPATIENT
Start: 2024-01-31

## 2024-01-31 ENCOUNTER — HOSPITAL ENCOUNTER (OUTPATIENT)
Facility: HOSPITAL | Age: 74
Setting detail: INFUSION SERIES
End: 2024-01-31

## 2024-01-31 DIAGNOSIS — M81.0 AGE RELATED OSTEOPOROSIS, UNSPECIFIED PATHOLOGICAL FRACTURE PRESENCE: ICD-10-CM

## 2024-01-31 DIAGNOSIS — M81.8 OTHER OSTEOPOROSIS WITHOUT CURRENT PATHOLOGICAL FRACTURE: Primary | ICD-10-CM

## 2024-02-12 ENCOUNTER — HOSPITAL ENCOUNTER (OUTPATIENT)
Facility: HOSPITAL | Age: 74
Setting detail: INFUSION SERIES
Discharge: HOME | End: 2024-02-12
Payer: MEDICARE

## 2024-02-12 VITALS
WEIGHT: 244.8 LBS | HEART RATE: 68 BPM | DIASTOLIC BLOOD PRESSURE: 67 MMHG | SYSTOLIC BLOOD PRESSURE: 140 MMHG | RESPIRATION RATE: 18 BRPM | TEMPERATURE: 98.1 F | HEIGHT: 69 IN | BODY MASS INDEX: 36.26 KG/M2 | OXYGEN SATURATION: 97 %

## 2024-02-12 DIAGNOSIS — M81.0 AGE RELATED OSTEOPOROSIS, UNSPECIFIED PATHOLOGICAL FRACTURE PRESENCE: Primary | ICD-10-CM

## 2024-02-12 PROCEDURE — 2580000003 HC RX 258: Performed by: INTERNAL MEDICINE

## 2024-02-12 PROCEDURE — 96374 THER/PROPH/DIAG INJ IV PUSH: CPT

## 2024-02-12 PROCEDURE — 6360000002 HC RX W HCPCS: Performed by: INTERNAL MEDICINE

## 2024-02-12 RX ORDER — ZOLEDRONIC ACID 5 MG/100ML
5 INJECTION, SOLUTION INTRAVENOUS ONCE
Status: CANCELLED | OUTPATIENT
Start: 2025-02-09 | End: 2025-02-09

## 2024-02-12 RX ORDER — TAMSULOSIN HYDROCHLORIDE 0.4 MG/1
0.4 CAPSULE ORAL DAILY
COMMUNITY

## 2024-02-12 RX ORDER — ATORVASTATIN CALCIUM 10 MG/1
10 TABLET, FILM COATED ORAL DAILY
COMMUNITY

## 2024-02-12 RX ORDER — SODIUM CHLORIDE 9 MG/ML
5-250 INJECTION, SOLUTION INTRAVENOUS PRN
OUTPATIENT
Start: 2025-02-09

## 2024-02-12 RX ORDER — ZOLEDRONIC ACID 5 MG/100ML
5 INJECTION, SOLUTION INTRAVENOUS ONCE
Status: COMPLETED | OUTPATIENT
Start: 2024-02-12 | End: 2024-02-12

## 2024-02-12 RX ORDER — SODIUM CHLORIDE 9 MG/ML
5-250 INJECTION, SOLUTION INTRAVENOUS PRN
Status: DISCONTINUED | OUTPATIENT
Start: 2024-02-12 | End: 2024-02-13 | Stop reason: HOSPADM

## 2024-02-12 RX ORDER — SODIUM CHLORIDE 0.9 % (FLUSH) 0.9 %
5-40 SYRINGE (ML) INJECTION PRN
OUTPATIENT
Start: 2025-02-09

## 2024-02-12 RX ADMIN — ZOLEDRONIC ACID 5 MG: 0.05 INJECTION, SOLUTION INTRAVENOUS at 15:08

## 2024-02-12 RX ADMIN — SODIUM CHLORIDE 25 ML/HR: 9 INJECTION, SOLUTION INTRAVENOUS at 15:07

## 2024-02-12 ASSESSMENT — PAIN SCALES - GENERAL: PAINLEVEL_OUTOF10: 0

## 2024-02-12 NOTE — PROGRESS NOTES
[facility] Progress Note    Date: February 12, 2024        1430: Ms. Dozier arrived ambulatory and in no distress for Reclast Infusion. Assessment was completed, no acute issues or new complaints at this time. 24g PIV established to right arm without difficulty, positive blood return noted. Labs drawn on 02/08 are within parameters for treatment today.      Ms. Dozier's vitals were reviewed.  Patient Vitals for the past 12 hrs:   Temp Pulse Resp BP SpO2   02/12/24 1442 98.1 °F (36.7 °C) 68 18 (!) 140/67 97 %         Medications given.  Medications Administered         0.9 % sodium chloride infusion Admin Date  02/12/2024 Action  New Bag Dose  25 mL/hr Rate  25 mL/hr Route  IntraVENous Administered By  Cheli Barnes, RN        zoledronic acid (RECLAST) 5 mg/100 mL infusion Admin Date  02/12/2024 Action  New Bag Dose  5 mg Rate  400 mL/hr Route  IntraVENous Administered By  Cheli Barnes, RADHA              1550: Patient tolerated treatment well and was discharged from [facility] in stable condition.  PIV flushed and removed. Patient was advised to follow up with her physician regarding future appointments at the [facility].      Cheli Barnes RN  February 12, 2024

## 2024-12-10 ENCOUNTER — TRANSCRIBE ORDERS (OUTPATIENT)
Dept: SCHEDULING | Age: 74
End: 2024-12-10

## 2024-12-10 DIAGNOSIS — R60.0 LOCALIZED EDEMA: Primary | ICD-10-CM

## 2024-12-11 ENCOUNTER — HOSPITAL ENCOUNTER (OUTPATIENT)
Facility: HOSPITAL | Age: 74
Discharge: HOME | End: 2024-12-14
Payer: MEDICARE

## 2024-12-11 DIAGNOSIS — R60.0 LOCALIZED EDEMA: ICD-10-CM

## 2024-12-11 PROCEDURE — 93971 EXTREMITY STUDY: CPT

## 2025-02-06 RX ORDER — SODIUM CHLORIDE 9 MG/ML
5-250 INJECTION, SOLUTION INTRAVENOUS PRN
Status: CANCELLED | OUTPATIENT
Start: 2025-02-06

## 2025-02-06 RX ORDER — SODIUM CHLORIDE 0.9 % (FLUSH) 0.9 %
5-40 SYRINGE (ML) INJECTION PRN
Status: CANCELLED | OUTPATIENT
Start: 2025-02-06

## 2025-02-11 RX ORDER — ACETAMINOPHEN 325 MG/1
650 TABLET ORAL
Status: CANCELLED | OUTPATIENT
Start: 2025-02-11

## 2025-02-11 RX ORDER — SODIUM CHLORIDE 9 MG/ML
INJECTION, SOLUTION INTRAVENOUS CONTINUOUS
Status: CANCELLED | OUTPATIENT
Start: 2025-02-11

## 2025-02-11 RX ORDER — EPINEPHRINE 1 MG/ML
0.3 INJECTION, SOLUTION INTRAMUSCULAR; SUBCUTANEOUS PRN
Status: CANCELLED | OUTPATIENT
Start: 2025-02-11

## 2025-02-11 RX ORDER — HEPARIN 100 UNIT/ML
500 SYRINGE INTRAVENOUS PRN
Status: CANCELLED | OUTPATIENT
Start: 2025-02-11

## 2025-02-11 RX ORDER — LIDOCAINE HYDROCHLORIDE 10 MG/ML
0.25 INJECTION, SOLUTION EPIDURAL; INFILTRATION; INTRACAUDAL; PERINEURAL
Status: CANCELLED | OUTPATIENT
Start: 2025-02-11

## 2025-02-11 RX ORDER — ONDANSETRON 2 MG/ML
8 INJECTION INTRAMUSCULAR; INTRAVENOUS
Status: CANCELLED | OUTPATIENT
Start: 2025-02-11

## 2025-02-11 RX ORDER — HYDROCORTISONE SODIUM SUCCINATE 100 MG/2ML
100 INJECTION INTRAMUSCULAR; INTRAVENOUS
Status: CANCELLED | OUTPATIENT
Start: 2025-02-11

## 2025-02-11 RX ORDER — ALBUTEROL SULFATE 90 UG/1
4 INHALANT RESPIRATORY (INHALATION) PRN
Status: CANCELLED | OUTPATIENT
Start: 2025-02-11

## 2025-02-11 RX ORDER — FAMOTIDINE 10 MG/ML
20 INJECTION, SOLUTION INTRAVENOUS
Status: CANCELLED | OUTPATIENT
Start: 2025-02-11

## 2025-02-11 RX ORDER — ALBUTEROL SULFATE 0.83 MG/ML
2.5 SOLUTION RESPIRATORY (INHALATION)
Status: CANCELLED | OUTPATIENT
Start: 2025-02-11

## 2025-02-11 RX ORDER — DIPHENHYDRAMINE HYDROCHLORIDE 50 MG/ML
50 INJECTION INTRAMUSCULAR; INTRAVENOUS
Status: CANCELLED | OUTPATIENT
Start: 2025-02-11

## 2025-02-13 ENCOUNTER — HOSPITAL ENCOUNTER (OUTPATIENT)
Facility: HOSPITAL | Age: 75
Setting detail: INFUSION SERIES
Discharge: HOME | End: 2025-02-13
Payer: MEDICARE

## 2025-02-13 VITALS
HEIGHT: 69 IN | HEART RATE: 70 BPM | TEMPERATURE: 98 F | BODY MASS INDEX: 35.55 KG/M2 | WEIGHT: 240 LBS | DIASTOLIC BLOOD PRESSURE: 75 MMHG | OXYGEN SATURATION: 98 % | SYSTOLIC BLOOD PRESSURE: 139 MMHG | RESPIRATION RATE: 18 BRPM

## 2025-02-13 DIAGNOSIS — M81.0 AGE RELATED OSTEOPOROSIS, UNSPECIFIED PATHOLOGICAL FRACTURE PRESENCE: Primary | ICD-10-CM

## 2025-02-13 PROCEDURE — 96374 THER/PROPH/DIAG INJ IV PUSH: CPT

## 2025-02-13 PROCEDURE — 6360000002 HC RX W HCPCS: Performed by: INTERNAL MEDICINE

## 2025-02-13 RX ORDER — SODIUM CHLORIDE 9 MG/ML
INJECTION, SOLUTION INTRAVENOUS CONTINUOUS
OUTPATIENT
Start: 2026-02-08

## 2025-02-13 RX ORDER — HYDROCORTISONE SODIUM SUCCINATE 100 MG/2ML
100 INJECTION INTRAMUSCULAR; INTRAVENOUS
OUTPATIENT
Start: 2026-02-08

## 2025-02-13 RX ORDER — ALBUTEROL SULFATE 90 UG/1
4 INHALANT RESPIRATORY (INHALATION) PRN
OUTPATIENT
Start: 2026-02-08

## 2025-02-13 RX ORDER — SODIUM CHLORIDE 9 MG/ML
5-250 INJECTION, SOLUTION INTRAVENOUS PRN
OUTPATIENT
Start: 2026-02-08

## 2025-02-13 RX ORDER — ZOLEDRONIC ACID 0.05 MG/ML
5 INJECTION, SOLUTION INTRAVENOUS ONCE
Status: COMPLETED | OUTPATIENT
Start: 2025-02-13 | End: 2025-02-13

## 2025-02-13 RX ORDER — DIPHENHYDRAMINE HYDROCHLORIDE 50 MG/ML
50 INJECTION INTRAMUSCULAR; INTRAVENOUS
OUTPATIENT
Start: 2026-02-08

## 2025-02-13 RX ORDER — EPINEPHRINE 1 MG/ML
0.3 INJECTION, SOLUTION INTRAMUSCULAR; SUBCUTANEOUS PRN
OUTPATIENT
Start: 2026-02-08

## 2025-02-13 RX ORDER — FAMOTIDINE 10 MG/ML
20 INJECTION, SOLUTION INTRAVENOUS
OUTPATIENT
Start: 2026-02-08

## 2025-02-13 RX ORDER — LIDOCAINE HYDROCHLORIDE 10 MG/ML
0.25 INJECTION, SOLUTION EPIDURAL; INFILTRATION; INTRACAUDAL; PERINEURAL
OUTPATIENT
Start: 2026-02-08

## 2025-02-13 RX ORDER — SODIUM CHLORIDE 9 MG/ML
5-250 INJECTION, SOLUTION INTRAVENOUS PRN
Status: DISCONTINUED | OUTPATIENT
Start: 2025-02-13 | End: 2025-02-14 | Stop reason: HOSPADM

## 2025-02-13 RX ORDER — ONDANSETRON 2 MG/ML
8 INJECTION INTRAMUSCULAR; INTRAVENOUS
OUTPATIENT
Start: 2026-02-08

## 2025-02-13 RX ORDER — ALBUTEROL SULFATE 0.83 MG/ML
2.5 SOLUTION RESPIRATORY (INHALATION)
OUTPATIENT
Start: 2026-02-08

## 2025-02-13 RX ORDER — SODIUM CHLORIDE 0.9 % (FLUSH) 0.9 %
5-40 SYRINGE (ML) INJECTION PRN
OUTPATIENT
Start: 2026-02-08

## 2025-02-13 RX ORDER — ZOLEDRONIC ACID 0.05 MG/ML
5 INJECTION, SOLUTION INTRAVENOUS ONCE
Status: CANCELLED | OUTPATIENT
Start: 2026-02-08 | End: 2026-02-08

## 2025-02-13 RX ORDER — ACETAMINOPHEN 325 MG/1
650 TABLET ORAL
OUTPATIENT
Start: 2026-02-08

## 2025-02-13 RX ORDER — HEPARIN 100 UNIT/ML
500 SYRINGE INTRAVENOUS PRN
OUTPATIENT
Start: 2026-02-08

## 2025-02-13 RX ADMIN — ZOLEDRONIC ACID 5 MG: 0.05 INJECTION, SOLUTION INTRAVENOUS at 13:40

## 2025-02-13 ASSESSMENT — PAIN SCALES - GENERAL: PAINLEVEL_OUTOF10: 0

## 2025-02-13 NOTE — PROGRESS NOTES
[facility] Short Visit Progress Note    Ms. Dozier admitted to [facility] for Reclast ambulatory in stable condition. Assessment completed. No new concerns voiced. Lab values unable to cross from [employer] machine to [employer]. Lab values manually entered into Nursing Communication order per pharmacy request.    Vital Signs:  /75   Pulse 70   Temp 98 °F (36.7 °C) (Temporal)   Resp 18   Ht 1.753 m (5' 9\")   Wt 108.9 kg (240 lb)   SpO2 98%   BMI 35.44 kg/m²       24G PIV placed with positive blood return.         Medications:  Medications Administered         zoledronic acid (RECLAST) 5 mg/100 mL infusion Admin Date  02/13/2025 Action  New Bag Dose  5 mg Rate  400 mL/hr Route  IntraVENous Documented By  Isiah Myles, RN                  Patient tolerated treatment well. PIV removed. Patient discharged from [facility] ambulatory in no distress. Patient aware of next appointment.    Isiah Myles RN  February 13, 2025    No future appointments.

## (undated) DEVICE — SYR 5ML 1/5 GRAD LL NSAF LF --

## (undated) DEVICE — COVER,MAYO STAND,STERILE: Brand: MEDLINE

## (undated) DEVICE — CANISTER, RIGID, 3000CC: Brand: MEDLINE INDUSTRIES, INC.

## (undated) DEVICE — DRAPE MICSCP W46XL120IN FOR ZEISS MD FEATURING CLEARLENS

## (undated) DEVICE — MAGNETIC INSTR DRAPE 20X16: Brand: MEDLINE INDUSTRIES, INC.

## (undated) DEVICE — TOOL 14MH30 LEGEND 14CM 3MM: Brand: MIDAS REX ™

## (undated) DEVICE — FLOSEAL HEMOSTATIC MATRIX, 10 ML: Brand: FLOSEAL

## (undated) DEVICE — 3M™ IOBAN™ 2 ANTIMICROBIAL INCISE DRAPE 6650EZ: Brand: IOBAN™ 2

## (undated) DEVICE — 3M™ TEGADERM™ TRANSPARENT FILM DRESSING FRAME STYLE, 1624W, 2-3/8 IN X 2-3/4 IN (6 CM X 7 CM), 100/CT 4CT/CASE: Brand: 3M™ TEGADERM™

## (undated) DEVICE — TIP CLEANER: Brand: VALLEYLAB

## (undated) DEVICE — TOTAL TRAY, 16FR 10ML SIL FOLEY, URN: Brand: MEDLINE

## (undated) DEVICE — BIPOLAR FORCEPS CORD: Brand: VALLEYLAB

## (undated) DEVICE — STOPCOCK IV 3W --

## (undated) DEVICE — SPONGE: SPECIALTY PEANUT XR 100/CS: Brand: MEDICAL ACTION INDUSTRIES

## (undated) DEVICE — COVER,TABLE,60X90,STERILE: Brand: MEDLINE

## (undated) DEVICE — SPONGE GZ W4XL4IN COT 12 PLY TYP VII WVN C FLD DSGN

## (undated) DEVICE — INSULATED BLADE ELECTRODE: Brand: EDGE

## (undated) DEVICE — (D)SOL MEDC ALC ISO 70% 16OZ -- CONVERT TO ITEM 364515

## (undated) DEVICE — SYRINGE MED 20ML STD CLR PLAS LUERLOCK TIP N CTRL DISP

## (undated) DEVICE — DRAPE XR C ARM 41X74IN LF --

## (undated) DEVICE — STERILE POLYISOPRENE POWDER-FREE SURGICAL GLOVES: Brand: PROTEXIS

## (undated) DEVICE — PREP CHLORAPREP 10.5 ML ORG --

## (undated) DEVICE — DRAPE,REIN 53X77,STERILE: Brand: MEDLINE

## (undated) DEVICE — 1010 S-DRAPE TOWEL DRAPE 10/BX: Brand: STERI-DRAPE™

## (undated) DEVICE — PACK,BASIC,SIRUS,V: Brand: MEDLINE

## (undated) DEVICE — ROCKER SWITCH PENCIL BLADE ELECTRODE, HOLSTER: Brand: EDGE

## (undated) DEVICE — SUTURE MCRYL SZ 4-0 L27IN ABSRB UD L19MM PS-2 1/2 CIR PRIM Y426H

## (undated) DEVICE — ACCY PA100-A LEGEND LUB/DIFFUSER 4 PACK: Brand: MIDAS REX®

## (undated) DEVICE — CODMAN® SURGICAL PATTIES 3/4" X 3/4" (1.91CM X 1.91CM): Brand: CODMAN®

## (undated) DEVICE — GOWN,SIRUS,NONRNF,SETINSLV,XL,20/CS: Brand: MEDLINE

## (undated) DEVICE — 3M™ TEGADERM™ TRANSPARENT FILM DRESSING FRAME STYLE, 1626W, 4 IN X 4-3/4 IN (10 CM X 12 CM), 50/CT 4CT/CASE: Brand: 3M™ TEGADERM™

## (undated) DEVICE — REM POLYHESIVE ADULT PATIENT RETURN ELECTRODE: Brand: VALLEYLAB

## (undated) DEVICE — DRAPE,UTILITY,TAPE,15X26,STERILE: Brand: MEDLINE

## (undated) DEVICE — COVER LT HNDL PLAS RIG 1 PER PK

## (undated) DEVICE — NDL SPNE QNCKE 18GX3.5IN LF --

## (undated) DEVICE — STERILE POLYISOPRENE POWDER-FREE SURGICAL GLOVES WITH EMOLLIENT COATING: Brand: PROTEXIS

## (undated) DEVICE — SOLUTION IV 1000ML 0.9% SOD CHL

## (undated) DEVICE — SOLUTION IRRIG 1000ML H2O STRL BLT

## (undated) DEVICE — CATHETER IV 14GA L1.25IN TEF STR HUB INTROCAN SFTY

## (undated) DEVICE — BONE WAX WHITE: Brand: BONE WAX WHITE

## (undated) DEVICE — SURGICAL PROCEDURE PACK BASIN MAJ SET CUST NO CAUT

## (undated) DEVICE — INFECTION CONTROL KIT SYS

## (undated) DEVICE — SUTURE VCRL SZ 3-0 L27IN ABSRB UD L26MM SH 1/2 CIR J416H

## (undated) DEVICE — BIT DRL L12MM DIA2.3MM CERV STP W/ EPOXY RNG DISP PYRENEES

## (undated) DEVICE — DRAPE,CHEST,FENES,15X10,STERIL: Brand: MEDLINE

## (undated) DEVICE — TUBING, SUCTION, 1/4" X 10', STRAIGHT: Brand: MEDLINE

## (undated) DEVICE — STRIP,CLOSURE,WOUND,MEDI-STRIP,1/2X4: Brand: MEDLINE

## (undated) DEVICE — TAPE,CLOTH/SILK,CURAD,3"X10YD,LF,40/CS: Brand: CURAD

## (undated) DEVICE — STRAP,POSITIONING,KNEE/BODY,FOAM,4X60": Brand: MEDLINE

## (undated) DEVICE — PAD,NON-ADHERENT,3X8,STERILE,LF,1/PK: Brand: MEDLINE